# Patient Record
Sex: FEMALE | Race: WHITE | Employment: PART TIME | URBAN - METROPOLITAN AREA
[De-identification: names, ages, dates, MRNs, and addresses within clinical notes are randomized per-mention and may not be internally consistent; named-entity substitution may affect disease eponyms.]

---

## 2019-01-25 PROBLEM — N92.6 IRREGULAR MENSES: Status: ACTIVE | Noted: 2019-01-25

## 2019-01-25 PROBLEM — N92.6 MISSED PERIOD: Status: ACTIVE | Noted: 2019-01-25

## 2019-01-25 PROBLEM — D50.9 MICROCYTIC ANEMIA: Status: ACTIVE | Noted: 2019-01-25

## 2019-01-25 PROBLEM — D50.9 IRON DEFICIENCY ANEMIA: Status: ACTIVE | Noted: 2019-01-25

## 2019-01-25 PROBLEM — D50.9 MICROCYTIC ANEMIA: Status: RESOLVED | Noted: 2019-01-25 | Resolved: 2019-01-25

## 2019-01-25 PROBLEM — R53.83 FATIGUE: Status: ACTIVE | Noted: 2019-01-25

## 2019-01-26 PROBLEM — E53.8 LOW VITAMIN B12 LEVEL: Status: ACTIVE | Noted: 2019-01-26

## 2019-01-26 PROBLEM — R79.89 LOW VITAMIN B12 LEVEL: Status: ACTIVE | Noted: 2019-01-26

## 2019-02-28 PROBLEM — N92.1 MENORRHAGIA WITH IRREGULAR CYCLE: Status: ACTIVE | Noted: 2019-02-28

## 2019-02-28 PROBLEM — N92.6 MISSED PERIOD: Status: RESOLVED | Noted: 2019-01-25 | Resolved: 2019-02-28

## 2019-03-11 PROBLEM — Q51.28 UTERUS DIDELPHYS: Status: ACTIVE | Noted: 2019-03-11

## 2019-05-08 PROBLEM — L05.91 PILONIDAL CYST: Status: ACTIVE | Noted: 2019-05-08

## 2019-08-07 PROBLEM — L65.9 HAIR LOSS: Status: RESOLVED | Noted: 2019-01-25 | Resolved: 2019-08-07

## 2019-08-07 PROBLEM — R53.83 FATIGUE: Status: RESOLVED | Noted: 2019-01-25 | Resolved: 2019-08-07

## 2020-02-07 PROBLEM — E55.9 VITAMIN D DEFICIENCY: Status: ACTIVE | Noted: 2020-02-07

## 2020-02-07 PROBLEM — R63.39 PICKY EATER: Status: ACTIVE | Noted: 2020-02-07

## 2020-08-12 PROBLEM — N94.6 DYSMENORRHEA: Status: ACTIVE | Noted: 2020-08-12

## 2022-12-05 ENCOUNTER — TELEPHONE (OUTPATIENT)
Dept: OBGYN CLINIC | Age: 22
End: 2022-12-05

## 2022-12-05 DIAGNOSIS — O20.9 VAGINAL BLEEDING IN PREGNANCY, FIRST TRIMESTER: Primary | ICD-10-CM

## 2022-12-05 NOTE — TELEPHONE ENCOUNTER
Informed, she was OK waiting if nothing was sooner and as long as you are not too concerned, she is not either. She works tomorrow 522-9, nothing was available today either for her.

## 2022-12-05 NOTE — TELEPHONE ENCOUNTER
Santiago Zamudio had dating US last week, she was 7w3d on 12/1    She had some bleeding this weekend on Saturday that lasted about 6pm-1am    No clots, but heavy enough like a period.     Has IPV on 12/8 this Thursday    Her last hcg was over 26,000    Wondering if she needed to be seen sooner, recheck HCG?

## 2022-12-07 NOTE — TELEPHONE ENCOUNTER
No spotting since Satuday, feeling OK. No issues.     She is at work, she will try and go on lunch for labs

## 2022-12-08 PROBLEM — O34.591 PREGNANCY WITH CONGENITAL DUPLICATION OF UTERUS IN FIRST TRIMESTER: Status: ACTIVE | Noted: 2022-12-08

## 2022-12-08 PROBLEM — Q51.28 PREGNANCY WITH CONGENITAL DUPLICATION OF UTERUS IN FIRST TRIMESTER: Status: ACTIVE | Noted: 2022-12-08

## 2022-12-08 PROBLEM — O20.9 VAGINAL BLEEDING IN PREGNANCY, FIRST TRIMESTER: Status: ACTIVE | Noted: 2022-12-08

## 2023-01-04 ENCOUNTER — ROUTINE PRENATAL (OUTPATIENT)
Dept: OBGYN CLINIC | Age: 23
End: 2023-01-04

## 2023-01-04 VITALS — DIASTOLIC BLOOD PRESSURE: 68 MMHG | BODY MASS INDEX: 34.54 KG/M2 | WEIGHT: 214 LBS | SYSTOLIC BLOOD PRESSURE: 120 MMHG

## 2023-01-04 DIAGNOSIS — Z3A.12 12 WEEKS GESTATION OF PREGNANCY: ICD-10-CM

## 2023-01-04 DIAGNOSIS — O09.90 HIGH RISK PREGNANCY, ANTEPARTUM: Primary | ICD-10-CM

## 2023-01-04 DIAGNOSIS — O99.210 OBESITY IN PREGNANCY: ICD-10-CM

## 2023-01-04 DIAGNOSIS — Q51.28 UTERUS DIDELPHYS: ICD-10-CM

## 2023-01-04 DIAGNOSIS — O36.8390 ULTRASOUND SCAN DONE FOR INABILITY TO HEAR FETAL HEART TONES: ICD-10-CM

## 2023-01-04 PROCEDURE — 0502F SUBSEQUENT PRENATAL CARE: CPT | Performed by: ADVANCED PRACTICE MIDWIFE

## 2023-01-04 NOTE — PROGRESS NOTES
SUBJECTIVE:    Anjel Khan is here for her return OB visit. denies concerns today. She denies  feeling fetal movement. She denies vaginal bleeding. She denies vaginal discharge. She denies leaking of fluid. She denies uterine cramping. She denies  nausea and/or vomiting. OBJECTIVE:  Blood pressure 120/68, weight 214 lb (97.1 kg), not currently breastfeeding. Total weight gain: 2 lb (0.907 kg)    Anjel Moraleser accepted the flu vaccine as appropriate. Anjel Bill declined the COVID vaccine as appropriate    ASSESSMENT/PLAN:  IUP @ 12+2 weeks  S =D    High Risk Pregnancy  Due date is based on LMP and confirmed with 7w3d early dating ultrasound  Patient's prenatal labs are NOT completed. Patient's blood type A positive and rhogam is not indicated in pregnancy. Early glucola indicated: yes - not yet completed  Patient declined genetic screening. Anatomy scan referral sent  Total weight gain in pregnancy reviewed: Yes  Fetal movement reviewed      2. 12 weeks gestation of pregnancy  - Warning signs reviewed    3. Uterus didelphys  - Bhupinder Felix MD, Maternal Fetal Medicine, [facility]    4. Obesity in pregnancy  - early glucola  - Reviewed baby asa    5. Ultrasound scan done for inability to hear fetal heart tones        She was counseled regarding all of the above:    Return in about 4 weeks (around 2/1/2023) for Return OB.     The patient, Alex Austin,  was seen with a total time spent of 25 minutes for the visit on this date of service by the [facility]  On this date January 4, 2023,  I have spent greater than 50% of this visit:  [x] reviewing previous notes  [x] reviewing test results  [x] pre-charting  Discussed with patient:  [x] Importance of compliance with treatment plan  [x] Counseling  [] Coordinating care  [x] Documenting     Electronically Signed By: KAR Mesa CNM

## 2023-01-19 ENCOUNTER — HOSPITAL ENCOUNTER (OUTPATIENT)
Age: 23
Setting detail: SPECIMEN
Discharge: HOME | End: 2023-01-19

## 2023-01-19 DIAGNOSIS — O09.91 HIGH-RISK PREGNANCY, FIRST TRIMESTER: ICD-10-CM

## 2023-01-19 DIAGNOSIS — Z3A.08 8 WEEKS GESTATION OF PREGNANCY: ICD-10-CM

## 2023-01-19 LAB
ABO/RH: NORMAL
ABSOLUTE EOS #: 0.05 K/UL (ref 0–0.44)
ABSOLUTE IMMATURE GRANULOCYTE: 0.06 K/UL (ref 0–0.3)
ABSOLUTE LYMPH #: 1.69 K/UL (ref 1.1–3.7)
ABSOLUTE MONO #: 0.57 K/UL (ref 0.1–1.2)
ANTIBODY SCREEN: NEGATIVE
BASOPHILS # BLD: 0 % (ref 0–2)
BASOPHILS ABSOLUTE: 0.03 K/UL (ref 0–0.2)
EOSINOPHILS RELATIVE PERCENT: 1 % (ref 1–4)
GLUCOSE ADMINISTRATION: NORMAL
GLUCOSE TOLERANCE SCREEN 50G: 101 MG/DL (ref 70–135)
HCT VFR BLD CALC: 42.4 % (ref 36.3–47.1)
HEMOGLOBIN: 13.3 G/DL (ref 11.9–15.1)
HEPATITIS B SURFACE ANTIGEN: NONREACTIVE
HEPATITIS C ANTIBODY: NONREACTIVE
HIV AG/AB: NONREACTIVE
IMMATURE GRANULOCYTES: 1 %
LYMPHOCYTES # BLD: 19 % (ref 24–43)
MCH RBC QN AUTO: 27.4 PG (ref 25.2–33.5)
MCHC RBC AUTO-ENTMCNC: 31.4 G/DL (ref 28.4–34.8)
MCV RBC AUTO: 87.4 FL (ref 82.6–102.9)
MONOCYTES # BLD: 7 % (ref 3–12)
NRBC AUTOMATED: 0 PER 100 WBC
PDW BLD-RTO: 13.9 % (ref 11.8–14.4)
PLATELET # BLD: 276 K/UL (ref 138–453)
PMV BLD AUTO: 10.2 FL (ref 8.1–13.5)
RBC # BLD: 4.85 M/UL (ref 3.95–5.11)
RUBV IGG SER QL: 205.2 IU/ML
SEG NEUTROPHILS: 72 % (ref 36–65)
SEGMENTED NEUTROPHILS ABSOLUTE COUNT: 6.3 K/UL (ref 1.5–8.1)
T. PALLIDUM, IGG: NONREACTIVE
WBC # BLD: 8.7 K/UL (ref 3.5–11.3)

## 2023-01-20 LAB — VZV IGG SER QL IA: 2.41

## 2023-02-08 ENCOUNTER — ROUTINE PRENATAL (OUTPATIENT)
Dept: OBGYN CLINIC | Age: 23
End: 2023-02-08

## 2023-02-08 ENCOUNTER — HOSPITAL ENCOUNTER (OUTPATIENT)
Age: 23
Setting detail: SPECIMEN
Discharge: HOME | End: 2023-02-08

## 2023-02-08 VITALS — WEIGHT: 214 LBS | DIASTOLIC BLOOD PRESSURE: 78 MMHG | BODY MASS INDEX: 34.54 KG/M2 | SYSTOLIC BLOOD PRESSURE: 122 MMHG

## 2023-02-08 DIAGNOSIS — O99.210 OBESITY IN PREGNANCY: ICD-10-CM

## 2023-02-08 DIAGNOSIS — Q51.28 UTERUS DIDELPHYS: ICD-10-CM

## 2023-02-08 DIAGNOSIS — O09.90 HIGH RISK PREGNANCY, ANTEPARTUM: ICD-10-CM

## 2023-02-08 DIAGNOSIS — N89.8 VAGINAL ODOR: ICD-10-CM

## 2023-02-08 DIAGNOSIS — Z3A.17 17 WEEKS GESTATION OF PREGNANCY: Primary | ICD-10-CM

## 2023-02-08 DIAGNOSIS — M79.662 PAIN OF LEFT CALF: ICD-10-CM

## 2023-02-08 RX ORDER — ONDANSETRON 4 MG/1
4 TABLET, ORALLY DISINTEGRATING ORAL 3 TIMES DAILY PRN
Qty: 21 TABLET | Refills: 0 | Status: SHIPPED | OUTPATIENT
Start: 2023-02-08

## 2023-02-09 DIAGNOSIS — N89.8 VAGINAL ODOR: ICD-10-CM

## 2023-02-09 LAB
CANDIDA SPECIES, DNA PROBE: NEGATIVE
GARDNERELLA VAGINALIS, DNA PROBE: NEGATIVE
SOURCE: NORMAL
TRICHOMONAS VAGINALIS DNA: NEGATIVE

## 2023-02-28 ENCOUNTER — ROUTINE PRENATAL (OUTPATIENT)
Dept: PERINATAL CARE | Age: 23
End: 2023-02-28

## 2023-02-28 VITALS
RESPIRATION RATE: 16 BRPM | HEIGHT: 66 IN | DIASTOLIC BLOOD PRESSURE: 78 MMHG | SYSTOLIC BLOOD PRESSURE: 131 MMHG | WEIGHT: 216 LBS | TEMPERATURE: 98.2 F | HEART RATE: 84 BPM | BODY MASS INDEX: 34.72 KG/M2

## 2023-02-28 DIAGNOSIS — Z3A.20 20 WEEKS GESTATION OF PREGNANCY: ICD-10-CM

## 2023-02-28 DIAGNOSIS — Z36.86 ENCOUNTER FOR SCREENING FOR RISK OF PRE-TERM LABOR: ICD-10-CM

## 2023-02-28 DIAGNOSIS — Q51.28 PREGNANCY WITH CONGENITAL DUPLICATION OF UTERUS IN SECOND TRIMESTER: ICD-10-CM

## 2023-02-28 DIAGNOSIS — O34.592 PREGNANCY WITH CONGENITAL DUPLICATION OF UTERUS IN SECOND TRIMESTER: ICD-10-CM

## 2023-02-28 DIAGNOSIS — O99.212 OBESITY AFFECTING PREGNANCY IN SECOND TRIMESTER: ICD-10-CM

## 2023-02-28 DIAGNOSIS — O09.899 SINGLE UMBILICAL ARTERY, MATERNAL, ANTEPARTUM: ICD-10-CM

## 2023-02-28 DIAGNOSIS — O44.22 PLACENTA MARGINALIS IN SECOND TRIMESTER: Primary | ICD-10-CM

## 2023-02-28 LAB
ABDOMINAL CIRCUMFERENCE: NORMAL
ABDOMINAL CIRCUMFERENCE: NORMAL
BIPARIETAL DIAMETER: NORMAL
BIPARIETAL DIAMETER: NORMAL
ESTIMATED FETAL WEIGHT: NORMAL
ESTIMATED FETAL WEIGHT: NORMAL
FEMORAL DIAMETER: NORMAL
FEMORAL DIAMETER: NORMAL
HC/AC: NORMAL
HC/AC: NORMAL
HEAD CIRCUMFERENCE: NORMAL
HEAD CIRCUMFERENCE: NORMAL

## 2023-02-28 PROCEDURE — 99999 PR OFFICE/OUTPT VISIT,PROCEDURE ONLY: CPT | Performed by: OBSTETRICS & GYNECOLOGY

## 2023-02-28 PROCEDURE — 76817 TRANSVAGINAL US OBSTETRIC: CPT | Performed by: OBSTETRICS & GYNECOLOGY

## 2023-02-28 PROCEDURE — 76811 OB US DETAILED SNGL FETUS: CPT | Performed by: OBSTETRICS & GYNECOLOGY

## 2023-02-28 NOTE — PROGRESS NOTES
Obstetric/Gynecology Maternal Fetal Medicine Resident Note    Updated patient regarding US findings of marginal cord insertion and 2 vessel cord. She is agreeable to formal consult with MFM attending physician.      DO MARILYN Espinoza Resident, PGY2  [facility]  2/28/2023, 4:25 PM

## 2023-03-01 DIAGNOSIS — O43.199 MARGINAL INSERTION OF UMBILICAL CORD AFFECTING MANAGEMENT OF MOTHER: ICD-10-CM

## 2023-03-01 DIAGNOSIS — O09.899 TWO VESSEL UMBILICAL CORD, ANTEPARTUM, UNSPECIFIED TRIMESTER: ICD-10-CM

## 2023-03-01 DIAGNOSIS — Q51.28 PREGNANCY WITH CONGENITAL DUPLICATION OF UTERUS, ANTEPARTUM: Primary | ICD-10-CM

## 2023-03-01 DIAGNOSIS — O34.599 PREGNANCY WITH CONGENITAL DUPLICATION OF UTERUS, ANTEPARTUM: Primary | ICD-10-CM

## 2023-03-07 ENCOUNTER — ROUTINE PRENATAL (OUTPATIENT)
Dept: OBGYN CLINIC | Age: 23
End: 2023-03-07

## 2023-03-07 VITALS — DIASTOLIC BLOOD PRESSURE: 72 MMHG | BODY MASS INDEX: 34.86 KG/M2 | SYSTOLIC BLOOD PRESSURE: 110 MMHG | WEIGHT: 216 LBS

## 2023-03-07 DIAGNOSIS — O99.210 OBESITY IN PREGNANCY: ICD-10-CM

## 2023-03-07 DIAGNOSIS — Z3A.21 21 WEEKS GESTATION OF PREGNANCY: Primary | ICD-10-CM

## 2023-03-07 DIAGNOSIS — O09.899 SINGLE UMBILICAL ARTERY, MATERNAL, ANTEPARTUM: ICD-10-CM

## 2023-03-07 DIAGNOSIS — Q51.28 UTERUS DIDELPHYS: ICD-10-CM

## 2023-03-07 DIAGNOSIS — O44.20 PLACENTA MARGINALIS, ANTEPARTUM: ICD-10-CM

## 2023-03-07 DIAGNOSIS — O09.90 HIGH RISK PREGNANCY, ANTEPARTUM: ICD-10-CM

## 2023-03-07 PROCEDURE — 0502F SUBSEQUENT PRENATAL CARE: CPT | Performed by: NURSE PRACTITIONER

## 2023-03-07 RX ORDER — ONDANSETRON 4 MG/1
4 TABLET, FILM COATED ORAL EVERY 8 HOURS PRN
Qty: 30 TABLET | Refills: 1 | Status: SHIPPED | OUTPATIENT
Start: 2023-03-07

## 2023-03-07 NOTE — PROGRESS NOTES
Presents for OB visit  Gestation 21w1d  Estimated Date of Delivery: 23    Insurance [employer]    IPV bag/mug given: 22    G1P    Plans to deliver: [facility]    1st trimester screen/NIPs: unsure    AFP    Fetal Echo    High Risk:  Uterus didelphys  Vaginal bleeding in pregnancy   Single umbilical artery  Placenta marginalis      Early 1 hr GTT: Yes - passed 101 on 23    Covid Vaccine: No- Declines    Flu Vaccine: 22    Tdap:    Rhogam:    1hr GTT:    3hr GTT:    GBS:    2nd trimester appointment with Dr. Wells :     3rd trimester appointment with Dr. Wells :                     OB History    Para Term  AB Living   1 0 0 0 0 0   SAB IAB Ectopic Molar Multiple Live Births   0 0 0 0 0 0      # Outcome Date GA Lbr Colin/2nd Weight Sex Delivery Anes PTL Lv   1 Current                     Allergies   Allergen Reactions    No Known Allergies      Current Outpatient Medications   Medication Sig Dispense Refill    ondansetron (ZOFRAN) 4 MG tablet Take 1 tablet by mouth every 8 hours as needed for Nausea or Vomiting 30 tablet 1    Prenat w/o R-DE-Tsfcasx-FA-DHA (PRENATE ENHANCE) 28-0.6-0.4-400 MG CAPS Take 1 capsule by mouth daily 30 capsule 11     No current facility-administered medications for this visit.           Past Medical History:   Diagnosis Date    Pilonidal cyst     Removed 2019    Uterine anomaly        Past Surgical History:   Procedure Laterality Date    CYST REMOVAL  2019     Headaches: no  Swelling: no  Bleeding: no  Discharge: no   Dysuria: no  Nausea: intermittently still takes zofranPRN  Heartburn: no  Epigastric pain: no  Contractions: no  Leakage of fluid: no  + fetal movement       Return 4 WEEKS    Labs as indicated CBC,1 hr GTT, UA between 24-28 weeks  Antibody screen: n/a     PTL signs reviewed, if indicated  Kick Count Instructions given if indicated:  The patient was instructed on fetal kick counts and was given a kick sheet to complete  every 8 hours. This is to begin at 28 weeks gestation. She was instructed that the baby should move at a minimum of ten times within one hour after a meal. The patient was instructed to lay down on her left side twenty minutes after eating and count movements for up to one hour with a target value of ten movements. She was instructed to notify the office if she did not make that target after two attempts or if after any attempt there was less than four movements. After hour numbers reviewed , along with labor signs if indicated. Contractions/cramping between 5-7 minutes and persisting even with attempts of increased water and laying on side. Fluid leakage, bleeding        Of the 30 minute duration appointment visit, Comfort Lazaro CNP spent at least 50% of the face-to-face time in counseling, explanation of diagnosis, planning of further management, and answering all questions.

## 2023-03-10 NOTE — TELEPHONE ENCOUNTER
Dr. Nando Pinon, patient interested in free prenatal vitamins and iron per response from [address] [facility]. [facility] Baby Box pending for your convenience. Thank you,  Deuce Barahona, PharmD  [address]  Department, toll free: [address]      =======================================================================    POPULATION HEALTH CLINICAL PHARMACY REVIEW - Be Well With Baby    SUBJECTIVE  Nuvia Landry is a 23 y.o. female currently identified as interested in receiving a [facility] \"Baby Box\" containing a 1 year supply of prenatal vitamins from [address] [place] [address]. Contents of Baby Box:  Welcome Letter  6 month supply of Nature's Blend Prenatal Multivitamin with Minerals (Take 1 softgel once daily) with 1 refill    Thank you  Vesna WrightD  [address]  Department, toll free: [phone]  [address]

## 2023-03-12 NOTE — PROGRESS NOTES
Ft leg pain  Presents for OB visit  Gestation 17w2d  Estimated Date of Delivery: 23    Insurance [employer]    IPV bag/mug given: 22    G1P    Plans to deliver: [facility]    1st trimester screen/NIPs: unsure    AFP    Fetal Echo    High Risk:  Uterus didelphys  Vaginal bleeding in pregnancy     Early 1 hr GTT: Yes ordered 22 BMI >30    Covid Vaccine: No- Declines    Flu Vaccine: 22    Tdap:    Rhogam:    1hr GTT:    3hr GTT:    GBS:    2nd trimester appointment with Dr. Parmjit Marie :     3rd trimester appointment with Dr. Parmjit Marie :      The patient was seen and evaluated. There was Positive fetal movements flutters No contractions or leakage of fluid. Signs and symptoms of  labor as well as labor were reviewed. The Nuchal Translucency testing was reviewed and found to be declined. A single marker MSAFP was declined for a 15-20 week gestational age window. TOP ST OH Reviewed. Dates were reviewed with the patient. An 18-22 week anatomy ultrasound has been ordered and scheduled 23. The patient will return to the office for her next visit in 4 weeks. See antepartum flow sheet. OB History    Para Term  AB Living   1 0 0 0 0 0   SAB IAB Ectopic Molar Multiple Live Births   0 0 0 0 0 0      # Outcome Date GA Lbr Colin/2nd Weight Sex Delivery Anes PTL Lv   1 Current                     Allergies   Allergen Reactions    No Known Allergies      Current Outpatient Medications   Medication Sig Dispense Refill    Prenat w/o E-NN-Cqmspjo-FA-DHA (PRENATE ENHANCE) 28-0.6-0.4-400 MG CAPS Take 1 capsule by mouth daily 30 capsule 11     No current facility-administered medications for this visit.            Past Medical History:   Diagnosis Date    Pilonidal cyst     Removed 2019    Uterine anomaly        Past Surgical History:   Procedure Laterality Date    CYST REMOVAL  2019     Headaches: no  Swelling: no  Bleeding: no  Discharge: no abnormal discharge, but PPD#1    Doing well. Pain well controlled on oral pain medication. Tolerating regular diet. Voiding well. Ambulating without difficulty. Lochia is scant. Breast feeding.     Vitals:    23 1830 23 1845 23 2104 23 0129   BP: 105/56 103/56 105/50 96/46   BP Location:   Left arm Left arm   Patient Position:   Semi-Greenfield's Semi-Greenfield's   Cuff Size:   Adult Regular Adult Regular   Pulse:   76 68   Resp:   16 16   Temp:   97.6  F (36.4  C) 98  F (36.7  C)   TempSrc:   Oral Oral   SpO2:       Weight:       Height:         General Appearance: NAD  Abdomen: Soft, NT, ND. Fundus firm at U-2  Extremities: NT, trace edema    Hemoglobin   Date Value Ref Range Status   2023 13.6 11.7 - 15.7 g/dL Final   2022 12.2 11.7 - 15.7 g/dL Final   2018 14.0 11.7 - 15.7 g/dL Final   2018 14.7 11.7 - 15.7 g/dL Final   ]    A/P: 28 year old  PPD#1 s/p . Hemodynamically stable.     S/p   -- routine postpartum cares  -- encouraged to ambulate  -- PO pain meds for pain control   -- regular diet    Dispo:   -- likely discharge home today at the 24 hour karla post delivery if infant is cleared for discharge as well or tomorrow     Enoch Gasca MD  [facility]            noting vaginal odor   Dysuria: no  Nausea: yes - still at times some times worse than other and vomiting at times has not lost weight. She states heartburn now making worse too. Encouraged pepcid 20mg bid and send in zofran     Epigastric pain: no  Contractions: no  Leakage of fluid: no  + fetal movement - feels flutters sensation at meka     She does have complaint of left calf pain, states maybe cramping feeling, but that has been pretty persistent over past week. She denies  redness or swelling to calf or chest pain or sob. Upon exam bilateral lower extremities: no swelling or erythema or increased warmth appreciated, distal pulses intact. Given pregnancy though and persistent calf pain recommend left lower extremity venous doppler to rule out DVT. Informed if develops increased pain, swelling or redness or chest pain sob seek emergent care. Pelvic exam culture obtained and sent  + white d/c no bleeding    Return 4 WEEKS    Labs as indicated vag dna    PTL signs reviewed, if indicated  Kick Count Instructions given if indicated: The patient was instructed on fetal kick counts and was given a kick sheet to complete every 8 hours. This is to begin at 28 weeks gestation. She was instructed that the baby should move at a minimum of ten times within one hour after a meal. The patient was instructed to lay down on her left side twenty minutes after eating and count movements for up to one hour with a target value of ten movements. She was instructed to notify the office if she did not make that target after two attempts or if after any attempt there was less than four movements. After hour numbers reviewed , along with labor signs if indicated. Contractions/cramping between 5-7 minutes and persisting even with attempts of increased water and laying on side.    Fluid leakage, bleeding        Of the 30 minute duration appointment visit, Tatum Quinn CNP spent at least 50% of the face-to-face time in counseling, explanation of diagnosis, planning of further management, and answering all questions.

## 2023-03-13 ENCOUNTER — ROUTINE PRENATAL (OUTPATIENT)
Dept: PERINATAL CARE | Age: 23
End: 2023-03-13
Payer: COMMERCIAL

## 2023-03-13 VITALS
RESPIRATION RATE: 16 BRPM | BODY MASS INDEX: 34.72 KG/M2 | DIASTOLIC BLOOD PRESSURE: 73 MMHG | TEMPERATURE: 98.4 F | SYSTOLIC BLOOD PRESSURE: 119 MMHG | HEIGHT: 66 IN | HEART RATE: 101 BPM | WEIGHT: 216.05 LBS

## 2023-03-13 DIAGNOSIS — Q51.28 PREGNANCY WITH CONGENITAL DUPLICATION OF UTERUS IN SECOND TRIMESTER: Primary | ICD-10-CM

## 2023-03-13 DIAGNOSIS — O99.212 OBESITY AFFECTING PREGNANCY IN SECOND TRIMESTER: ICD-10-CM

## 2023-03-13 DIAGNOSIS — Z3A.22 22 WEEKS GESTATION OF PREGNANCY: ICD-10-CM

## 2023-03-13 DIAGNOSIS — O34.592 PREGNANCY WITH CONGENITAL DUPLICATION OF UTERUS IN SECOND TRIMESTER: Primary | ICD-10-CM

## 2023-03-13 DIAGNOSIS — O09.899 SINGLE UMBILICAL ARTERY, MATERNAL, ANTEPARTUM: ICD-10-CM

## 2023-03-13 DIAGNOSIS — O44.22 PLACENTA MARGINALIS IN SECOND TRIMESTER: ICD-10-CM

## 2023-03-13 LAB
ABDOMINAL CIRCUMFERENCE: NORMAL
BIPARIETAL DIAMETER: NORMAL
ESTIMATED FETAL WEIGHT: NORMAL
FEMORAL DIAMETER: NORMAL
HC/AC: NORMAL
HEAD CIRCUMFERENCE: NORMAL

## 2023-03-13 PROCEDURE — 76817 TRANSVAGINAL US OBSTETRIC: CPT | Performed by: OBSTETRICS & GYNECOLOGY

## 2023-03-13 PROCEDURE — 76815 OB US LIMITED FETUS(S): CPT | Performed by: OBSTETRICS & GYNECOLOGY

## 2023-03-13 PROCEDURE — 99999 PR OFFICE/OUTPT VISIT,PROCEDURE ONLY: CPT | Performed by: OBSTETRICS & GYNECOLOGY

## 2023-03-27 ENCOUNTER — ROUTINE PRENATAL (OUTPATIENT)
Dept: PERINATAL CARE | Age: 23
End: 2023-03-27
Payer: COMMERCIAL

## 2023-03-27 VITALS
HEIGHT: 66 IN | HEART RATE: 121 BPM | SYSTOLIC BLOOD PRESSURE: 123 MMHG | DIASTOLIC BLOOD PRESSURE: 75 MMHG | RESPIRATION RATE: 16 BRPM | TEMPERATURE: 98.2 F | BODY MASS INDEX: 35.15 KG/M2 | WEIGHT: 218.7 LBS

## 2023-03-27 DIAGNOSIS — Z36.4 ULTRASOUND FOR ANTENATAL SCREENING FOR FETAL GROWTH RESTRICTION: ICD-10-CM

## 2023-03-27 DIAGNOSIS — O44.22 PLACENTA MARGINALIS IN SECOND TRIMESTER: ICD-10-CM

## 2023-03-27 DIAGNOSIS — O34.02 SEPTATE UTERUS AFFECTING PREGNANCY IN SECOND TRIMESTER: ICD-10-CM

## 2023-03-27 DIAGNOSIS — Z3A.24 24 WEEKS GESTATION OF PREGNANCY: ICD-10-CM

## 2023-03-27 DIAGNOSIS — Q51.28 SEPTATE UTERUS AFFECTING PREGNANCY IN SECOND TRIMESTER: ICD-10-CM

## 2023-03-27 DIAGNOSIS — O09.899 SINGLE UMBILICAL ARTERY, MATERNAL, ANTEPARTUM: Primary | ICD-10-CM

## 2023-03-27 DIAGNOSIS — O99.212 OBESITY AFFECTING PREGNANCY IN SECOND TRIMESTER: ICD-10-CM

## 2023-03-27 PROCEDURE — 99204 OFFICE O/P NEW MOD 45 MIN: CPT | Performed by: OBSTETRICS & GYNECOLOGY

## 2023-03-27 PROCEDURE — 76816 OB US FOLLOW-UP PER FETUS: CPT | Performed by: OBSTETRICS & GYNECOLOGY

## 2023-03-27 PROCEDURE — 76820 UMBILICAL ARTERY ECHO: CPT | Performed by: OBSTETRICS & GYNECOLOGY

## 2023-03-27 PROCEDURE — 76817 TRANSVAGINAL US OBSTETRIC: CPT | Performed by: OBSTETRICS & GYNECOLOGY

## 2023-03-29 ENCOUNTER — HOSPITAL ENCOUNTER (OUTPATIENT)
Age: 23
Setting detail: SPECIMEN
Discharge: HOME | End: 2023-03-29
Payer: COMMERCIAL

## 2023-03-29 ENCOUNTER — HOSPITAL ENCOUNTER (OUTPATIENT)
Age: 23
Discharge: HOME | End: 2023-03-29
Payer: COMMERCIAL

## 2023-03-29 DIAGNOSIS — O09.90 HIGH RISK PREGNANCY, ANTEPARTUM: ICD-10-CM

## 2023-03-29 DIAGNOSIS — Z3A.21 21 WEEKS GESTATION OF PREGNANCY: ICD-10-CM

## 2023-03-29 LAB
ABSOLUTE EOS #: 0.1 K/UL (ref 0–0.4)
ABSOLUTE LYMPH #: 1.6 K/UL (ref 1–4.8)
ABSOLUTE MONO #: 0.8 K/UL (ref 0.1–1.3)
BACTERIA: ABNORMAL
BASOPHILS # BLD: 0 % (ref 0–2)
BASOPHILS ABSOLUTE: 0 K/UL (ref 0–0.2)
BILIRUBIN URINE: NEGATIVE
CASTS UA: ABNORMAL /LPF
COLOR: YELLOW
EOSINOPHILS RELATIVE PERCENT: 1 % (ref 0–4)
EPITHELIAL CELLS UA: ABNORMAL /HPF
GLUCOSE ADMINISTRATION: NORMAL
GLUCOSE TOLERANCE SCREEN 50G: 107 MG/DL (ref 70–135)
GLUCOSE UR STRIP.AUTO-MCNC: NEGATIVE MG/DL
HCT VFR BLD AUTO: 34.1 % (ref 36–46)
HGB BLD-MCNC: 11.6 G/DL (ref 12–16)
KETONES UR STRIP.AUTO-MCNC: NEGATIVE MG/DL
LEUKOCYTE ESTERASE UR QL STRIP.AUTO: ABNORMAL
LYMPHOCYTES # BLD: 15 % (ref 24–44)
MCH RBC QN AUTO: 28.4 PG (ref 26–34)
MCHC RBC AUTO-ENTMCNC: 34 G/DL (ref 31–37)
MCV RBC AUTO: 83.7 FL (ref 80–100)
MONOCYTES # BLD: 7 % (ref 1–7)
NITRITE UR QL STRIP.AUTO: NEGATIVE
PDW BLD-RTO: 13.7 % (ref 11.5–14.9)
PLATELET # BLD AUTO: 265 K/UL (ref 150–450)
PMV BLD AUTO: 7.7 FL (ref 6–12)
PROT UR STRIP.AUTO-MCNC: 8 MG/DL (ref 5–8)
PROT UR STRIP.AUTO-MCNC: ABNORMAL MG/DL
RBC # BLD: 4.07 M/UL (ref 4–5.2)
RBC CLUMPS #/AREA URNS AUTO: ABNORMAL /HPF
SEG NEUTROPHILS: 77 % (ref 36–66)
SEGMENTED NEUTROPHILS ABSOLUTE COUNT: 8.4 K/UL (ref 1.3–9.1)
SPECIFIC GRAVITY UA: 1.02 (ref 1–1.03)
TURBIDITY: ABNORMAL
URINE HGB: NEGATIVE
UROBILINOGEN, URINE: NORMAL
WBC # BLD AUTO: 10.8 K/UL (ref 3.5–11)
WBC UA: ABNORMAL /HPF

## 2023-03-29 PROCEDURE — 82105 ALPHA-FETOPROTEIN SERUM: CPT

## 2023-03-29 PROCEDURE — 87086 URINE CULTURE/COLONY COUNT: CPT

## 2023-03-29 PROCEDURE — 81001 URINALYSIS AUTO W/SCOPE: CPT

## 2023-03-29 PROCEDURE — 36415 COLL VENOUS BLD VENIPUNCTURE: CPT

## 2023-03-29 PROCEDURE — 85025 COMPLETE CBC W/AUTO DIFF WBC: CPT

## 2023-03-29 PROCEDURE — 82950 GLUCOSE TEST: CPT

## 2023-03-30 LAB
MICROORGANISM SPEC CULT: NORMAL
SPECIMEN DESCRIPTION: NORMAL

## 2023-04-01 LAB
AFP INTERPRETATION: NORMAL
AFP MOM: 0.95
AFP SPECIMEN: NORMAL
AFP: 89 NG/ML
DATE OF BIRTH: NORMAL
DATING METHOD: NORMAL
DETERMINED BY: NORMAL
DIABETIC: NORMAL
DONOR EGG?: NORMAL
DUE DATE: NORMAL
ESTIMATED DUE DATE: NORMAL
FAMILY HISTORY NTD: NORMAL
GESTATIONAL AGE: NORMAL
IN VITRO FERTILIZATION: NORMAL
INSULIN REQ DIABETES: NO
LAST MENSTRUAL PERIOD: NORMAL
MATERNAL AGE AT EDD: 25.2 YR
MATERNAL WEIGHT: 218
MONOCHORIONIC TWINS: NORMAL
NUMBER OF FETUSES: NORMAL
PATIENT WEIGHT UNITS: NORMAL
PATIENT WEIGHT: NORMAL
RACE (MATERNAL): NORMAL
RACE: NORMAL
REPEAT SPECIMEN?: NORMAL
SMOKING: NO
SMOKING: NORMAL
VALPROIC/CARBAMAZEP: NORMAL
ZZ NTE CLEAN UP: HISTORY: NORMAL

## 2023-04-06 ENCOUNTER — CARE COORDINATION (OUTPATIENT)
Dept: OTHER | Facility: CLINIC | Age: 23
End: 2023-04-06

## 2023-04-06 NOTE — CARE COORDINATION
Ambulatory Care Coordination Note    ACM attempted to reach patient for introduction to Associate Care Management related to Maternity CM. HIPAA compliant message left requesting a return phone call at patient convenience. Plan for follow-up call in 7-10 days      Future Appointments   Date Time Provider Dez Monreal   4/24/2023  2:30 PM ULTRASONOGRAPHER 4 Mat Fetal [facility]   5/3/2023  7:45 AM Hernando Ernandez, APRN - CN [facility]   5/17/2023  3:00 PM DO Josiah [facility]buena [facility] ANNETTE Simmons, RN  Associate Care Manager   Cell: [phone]  [email or web address]

## 2023-04-06 NOTE — CARE COORDINATION
resources as needed. Discuss / follow up on: Goal progress and follow-up appointment scheduling  Summary Note: Patient reports that she is doing well. She denies any red-flag s/s or needs r/t DME, medications or appointments. She is now 25.3 weeks pregnant, due on 7/17/23. Patient's pregnancy is complicated by single umbilical artery and septate uterus. Patient states that she does not have new concerns or changes in her medications or history. She did participate in [facility] and has not completed the program at this time. She denies any needs currently and is appreciative of the follow-up call. Social determinants of health impacting care: None. Shared Decision Making completed with patient regarding benefits of CM. She is agreeable to a follow-up call with ACM in 4 weeks and will call with any needs in the meantime. ANNETTE Lezama, RN  Associate Care Manager   Cell: [phone]  [email or web address]

## 2023-04-24 ENCOUNTER — ROUTINE PRENATAL (OUTPATIENT)
Dept: PERINATAL CARE | Age: 23
End: 2023-04-24
Payer: COMMERCIAL

## 2023-04-24 VITALS
DIASTOLIC BLOOD PRESSURE: 76 MMHG | SYSTOLIC BLOOD PRESSURE: 122 MMHG | RESPIRATION RATE: 16 BRPM | WEIGHT: 220 LBS | HEIGHT: 66 IN | TEMPERATURE: 98.2 F | HEART RATE: 104 BPM | BODY MASS INDEX: 35.36 KG/M2

## 2023-04-24 DIAGNOSIS — Z13.89 ENCOUNTER FOR ROUTINE SCREENING FOR MALFORMATION USING ULTRASONICS: ICD-10-CM

## 2023-04-24 DIAGNOSIS — Z36.4 ULTRASOUND FOR ANTENATAL SCREENING FOR FETAL GROWTH RESTRICTION: ICD-10-CM

## 2023-04-24 DIAGNOSIS — O44.23 PLACENTA MARGINALIS IN THIRD TRIMESTER: ICD-10-CM

## 2023-04-24 DIAGNOSIS — O99.213 OBESITY AFFECTING PREGNANCY IN THIRD TRIMESTER: ICD-10-CM

## 2023-04-24 DIAGNOSIS — O09.899 SINGLE UMBILICAL ARTERY, MATERNAL, ANTEPARTUM: Primary | ICD-10-CM

## 2023-04-24 DIAGNOSIS — Q51.28 SEPTATE UTERUS AFFECTING PREGNANCY IN THIRD TRIMESTER: ICD-10-CM

## 2023-04-24 DIAGNOSIS — O34.03 SEPTATE UTERUS AFFECTING PREGNANCY IN THIRD TRIMESTER: ICD-10-CM

## 2023-04-24 DIAGNOSIS — Z3A.28 28 WEEKS GESTATION OF PREGNANCY: ICD-10-CM

## 2023-04-24 PROCEDURE — 76819 FETAL BIOPHYS PROFIL W/O NST: CPT | Performed by: OBSTETRICS & GYNECOLOGY

## 2023-04-24 PROCEDURE — 76827 ECHO EXAM OF FETAL HEART: CPT | Performed by: OBSTETRICS & GYNECOLOGY

## 2023-04-24 PROCEDURE — 76825 ECHO EXAM OF FETAL HEART: CPT | Performed by: OBSTETRICS & GYNECOLOGY

## 2023-04-24 PROCEDURE — 76817 TRANSVAGINAL US OBSTETRIC: CPT | Performed by: OBSTETRICS & GYNECOLOGY

## 2023-04-24 PROCEDURE — 76805 OB US >/= 14 WKS SNGL FETUS: CPT | Performed by: OBSTETRICS & GYNECOLOGY

## 2023-04-24 PROCEDURE — 76820 UMBILICAL ARTERY ECHO: CPT | Performed by: OBSTETRICS & GYNECOLOGY

## 2023-04-24 PROCEDURE — 99999 PR OFFICE/OUTPT VISIT,PROCEDURE ONLY: CPT | Performed by: OBSTETRICS & GYNECOLOGY

## 2023-04-24 PROCEDURE — 93325 DOPPLER ECHO COLOR FLOW MAPG: CPT | Performed by: OBSTETRICS & GYNECOLOGY

## 2023-05-03 ENCOUNTER — ROUTINE PRENATAL (OUTPATIENT)
Dept: OBGYN CLINIC | Age: 23
End: 2023-05-03

## 2023-05-03 VITALS — SYSTOLIC BLOOD PRESSURE: 118 MMHG | BODY MASS INDEX: 35.51 KG/M2 | WEIGHT: 220 LBS | DIASTOLIC BLOOD PRESSURE: 72 MMHG

## 2023-05-03 DIAGNOSIS — O09.90 HIGH RISK PREGNANCY, ANTEPARTUM: Primary | ICD-10-CM

## 2023-05-03 DIAGNOSIS — Q51.28 UTERUS DIDELPHYS: ICD-10-CM

## 2023-05-03 DIAGNOSIS — O99.210 OBESITY IN PREGNANCY: ICD-10-CM

## 2023-05-03 DIAGNOSIS — Z3A.29 29 WEEKS GESTATION OF PREGNANCY: ICD-10-CM

## 2023-05-03 DIAGNOSIS — O44.20 PLACENTA MARGINALIS, ANTEPARTUM: ICD-10-CM

## 2023-05-03 DIAGNOSIS — O09.899 SINGLE UMBILICAL ARTERY, MATERNAL, ANTEPARTUM: ICD-10-CM

## 2023-05-03 PROCEDURE — 0502F SUBSEQUENT PRENATAL CARE: CPT | Performed by: ADVANCED PRACTICE MIDWIFE

## 2023-05-03 NOTE — PROGRESS NOTES
SUBJECTIVE:    Matt Swanson is here for her return OB visit. denies concerns today. She reports  feeling fetal movement. She denies vaginal bleeding. She denies vaginal discharge. She denies leaking of fluid. She denies uterine cramping. She denies  nausea and/or vomiting. OBJECTIVE:  Blood pressure 118/72, weight 220 lb (99.8 kg), not currently breastfeeding. Total weight gain: 8 lb (3.629 kg)      Vaccinations:   Undecided about tdap. Reviewed recommendations. ASSESSMENT/PLAN:  IUP @ 29+2 weeks  S=D       High Risk Pregnancy  Due date is based on LMP and confirmed with 7w3d early dating ultrasound  Patient's prenatal labs are completed. Patient's blood type A POSITIVE  and rhogam is not indicated in the pregnancy  Early glucola indicated: yes - 101  Patient accepted genetic screening. Msafp was ordered: completed negative. NIPS pending  Anatomy scan scheduled 23 completed. Placenta posterior,normal cord insertion: MARGINAL cord insertion, cervical length 4.25cm, no low lying, no previa noted. Follow up in 2 weeks for completion of anatomy and CL. 2 vessel umbilical cord. 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 3/13/23 CL 4.01cm  3/27/23 EFW 55% CL 34-37mm  23 EFW 55% marginal cord insertion. CL 38-39mm. 2 vessel umbilical cord/single umbilical artery again noted. F/up in 4 weeks. Scheduled 23 fetal echo- no obvious evidence of congenital heart defects present today. Glucola between 24-28 weeks. completed   Pass  3/29/2023: Glucose tolerance screen 50g   Total weight gain in pregnancy reviewed: Yes  Fetal Kick Count was discussed and explained. She was instructed to lay on her left side 20 minutes after eating and count movements for up to 2 hours with a target value of 10 movements. Instructed to notify office if she does not make the target.   Reviewed GBS at 36 weeks and patient is agreeable  Reviewed signs and symptoms of  labor:  Yes  Reviewed signs and

## 2023-05-15 RX ORDER — ONDANSETRON 4 MG/1
4 TABLET, FILM COATED ORAL EVERY 8 HOURS PRN
Qty: 30 TABLET | Refills: 1 | Status: SHIPPED | OUTPATIENT
Start: 2023-05-15

## 2023-05-17 ENCOUNTER — ROUTINE PRENATAL (OUTPATIENT)
Dept: OBGYN CLINIC | Age: 23
End: 2023-05-17

## 2023-05-17 VITALS — BODY MASS INDEX: 36.15 KG/M2 | SYSTOLIC BLOOD PRESSURE: 112 MMHG | DIASTOLIC BLOOD PRESSURE: 62 MMHG | WEIGHT: 224 LBS

## 2023-05-17 DIAGNOSIS — O44.20 PLACENTA MARGINALIS, ANTEPARTUM: ICD-10-CM

## 2023-05-17 DIAGNOSIS — Q51.28 UTERUS DIDELPHYS: ICD-10-CM

## 2023-05-17 DIAGNOSIS — O09.899 SINGLE UMBILICAL ARTERY, MATERNAL, ANTEPARTUM: ICD-10-CM

## 2023-05-17 DIAGNOSIS — Z3A.31 31 WEEKS GESTATION OF PREGNANCY: Primary | ICD-10-CM

## 2023-05-17 DIAGNOSIS — O09.90 HIGH RISK PREGNANCY, ANTEPARTUM: ICD-10-CM

## 2023-05-17 PROCEDURE — 0502F SUBSEQUENT PRENATAL CARE: CPT | Performed by: OBSTETRICS & GYNECOLOGY

## 2023-05-17 SDOH — ECONOMIC STABILITY: HOUSING INSECURITY
IN THE LAST 12 MONTHS, WAS THERE A TIME WHEN YOU DID NOT HAVE A STEADY PLACE TO SLEEP OR SLEPT IN A SHELTER (INCLUDING NOW)?: NO

## 2023-05-17 SDOH — ECONOMIC STABILITY: FOOD INSECURITY: WITHIN THE PAST 12 MONTHS, THE FOOD YOU BOUGHT JUST DIDN'T LAST AND YOU DIDN'T HAVE MONEY TO GET MORE.: NEVER TRUE

## 2023-05-17 SDOH — ECONOMIC STABILITY: FOOD INSECURITY: WITHIN THE PAST 12 MONTHS, YOU WORRIED THAT YOUR FOOD WOULD RUN OUT BEFORE YOU GOT MONEY TO BUY MORE.: NEVER TRUE

## 2023-05-17 SDOH — ECONOMIC STABILITY: INCOME INSECURITY: HOW HARD IS IT FOR YOU TO PAY FOR THE VERY BASICS LIKE FOOD, HOUSING, MEDICAL CARE, AND HEATING?: NOT HARD AT ALL

## 2023-05-17 ASSESSMENT — SOCIAL DETERMINANTS OF HEALTH (SDOH)
WITHIN THE LAST YEAR, HAVE TO BEEN RAPED OR FORCED TO HAVE ANY KIND OF SEXUAL ACTIVITY BY YOUR PARTNER OR EX-PARTNER?: NO
WITHIN THE LAST YEAR, HAVE YOU BEEN HUMILIATED OR EMOTIONALLY ABUSED IN OTHER WAYS BY YOUR PARTNER OR EX-PARTNER?: NO
WITHIN THE LAST YEAR, HAVE YOU BEEN KICKED, HIT, SLAPPED, OR OTHERWISE PHYSICALLY HURT BY YOUR PARTNER OR EX-PARTNER?: NO
WITHIN THE LAST YEAR, HAVE YOU BEEN AFRAID OF YOUR PARTNER OR EX-PARTNER?: NO

## 2023-05-17 NOTE — PROGRESS NOTES
Noe Summers is a 20 y.o. female 31w2d        OB History    Para Term  AB Living   1 0 0 0 0 0   SAB IAB Ectopic Molar Multiple Live Births   0 0 0 0 0 0      # Outcome Date GA Lbr Colin/2nd Weight Sex Delivery Anes PTL Lv   1 Current              Vitals  BP: 112/62  Weight - Scale: 224 lb (101.6 kg)  Patient Position: Sitting  Fundal Height (cm): 30 cm  Fetal HR: 131  Movement: Present    The patient was seen and evaluated. There was positive fetal movements. No contractions or leakage of fluid. Signs and symptoms of  labor as well as labor were reviewed. The S/S of Pre-Eclampsia were reviewed with the patient in detail. She is to report any of these if they occur. She currently denies any of these. The patient had her 28 week labs completed. No visits with results within 5 Week(s) from this visit.    Latest known visit with results is:   Hospital Outpatient Visit on 2023   Component Date Value Ref Range Status    Date of Birth 2023 [id number]   Final    Maternal Weight 2023 218   Final    Patient Weight Units 2023 LBS   Final    Due Date 2023 SEE NOTE   Final    Comment: Results for Estimated Due Date: 23       Determined by 2023 Ultrasound   Final    Last Menstrual Period 2023 [id number]   Final    Monochorionic Twins 2023 INFORMATION NOT PROVIDED   Final    Race (Maternal) 2023 WHITE   Final    Diabetic 2023 N   Final    Smoking 2023 N   Final    Valproic/Carbamazep 2023 INFORMATION NOT PROVIDED   Final    Fam HX NTD 2023 INFORMATION NOT PROVIDED   Final    In Vitro Fertilization 2023 INFORMATION NOT PROVIDED   Final    Donor Egg? 2023 INFORMATION NOT PROVIDED   Final    Repeat Specimen? 2023 INFORMATION NOT PROVIDED   Final    AFP (Alpha Fetoprotein) 2023 89  ng/mL Final    AFP Mom 2023 0.95   Final    AFP Interp 2023 Screen Neg   Final    Comment:

## 2023-05-18 ENCOUNTER — CARE COORDINATION (OUTPATIENT)
Dept: OTHER | Facility: CLINIC | Age: 23
End: 2023-05-18

## 2023-05-18 NOTE — CARE COORDINATION
Ambulatory Care Coordination Note    ACM attempted to reach patient to Associate Care Management related to Zuleika Gamboa follow-up call. HIPAA compliant message left requesting a return phone call at patient convenience. Plan for follow-up call in 7-10 days      Future Appointments   Date Time Provider Dez Monreal   5/26/2023  9:00 AM P [facility]   5/26/2023  9:30 AM [facility] Schmitt [facility]   6/5/2023  2:00 PM [facility]   6/5/2023  2:30 PM KAR Amaya CNP [facility] ANNETTE Kay, RN  Associate Care Manager   Cell: [phone]  [email or web address]

## 2023-05-26 ENCOUNTER — ROUTINE PRENATAL (OUTPATIENT)
Dept: OBGYN CLINIC | Age: 23
End: 2023-05-26

## 2023-05-26 VITALS — SYSTOLIC BLOOD PRESSURE: 116 MMHG | DIASTOLIC BLOOD PRESSURE: 68 MMHG | BODY MASS INDEX: 36.07 KG/M2 | WEIGHT: 223.5 LBS

## 2023-05-26 DIAGNOSIS — O09.90 HIGH RISK PREGNANCY, ANTEPARTUM: Primary | ICD-10-CM

## 2023-05-26 DIAGNOSIS — O09.899 SINGLE UMBILICAL ARTERY, MATERNAL, ANTEPARTUM: ICD-10-CM

## 2023-05-26 DIAGNOSIS — Z3A.32 32 WEEKS GESTATION OF PREGNANCY: ICD-10-CM

## 2023-05-26 DIAGNOSIS — O44.20 PLACENTA MARGINALIS, ANTEPARTUM: ICD-10-CM

## 2023-05-26 DIAGNOSIS — O99.210 OBESITY IN PREGNANCY: ICD-10-CM

## 2023-05-26 DIAGNOSIS — Q51.28 UTERUS DIDELPHYS: ICD-10-CM

## 2023-05-26 ASSESSMENT — ANXIETY QUESTIONNAIRES
GAD7 TOTAL SCORE: 0
7. FEELING AFRAID AS IF SOMETHING AWFUL MIGHT HAPPEN: 0
3. WORRYING TOO MUCH ABOUT DIFFERENT THINGS: 0
5. BEING SO RESTLESS THAT IT IS HARD TO SIT STILL: 0
IF YOU CHECKED OFF ANY PROBLEMS ON THIS QUESTIONNAIRE, HOW DIFFICULT HAVE THESE PROBLEMS MADE IT FOR YOU TO DO YOUR WORK, TAKE CARE OF THINGS AT HOME, OR GET ALONG WITH OTHER PEOPLE: NOT DIFFICULT AT ALL
6. BECOMING EASILY ANNOYED OR IRRITABLE: 0
2. NOT BEING ABLE TO STOP OR CONTROL WORRYING: 0
4. TROUBLE RELAXING: 0
1. FEELING NERVOUS, ANXIOUS, OR ON EDGE: 0

## 2023-05-26 ASSESSMENT — PATIENT HEALTH QUESTIONNAIRE - PHQ9
SUM OF ALL RESPONSES TO PHQ QUESTIONS 1-9: 0
2. FEELING DOWN, DEPRESSED OR HOPELESS: 0
1. LITTLE INTEREST OR PLEASURE IN DOING THINGS: 0
SUM OF ALL RESPONSES TO PHQ9 QUESTIONS 1 & 2: 0

## 2023-05-26 NOTE — PROGRESS NOTES
SUBJECTIVE:    Parveen Bourgeois is here for her return OB visit. denies concerns today. She reports  feeling fetal movement. She denies vaginal bleeding. She denies vaginal discharge. She denies leaking of fluid. She denies uterine cramping. She denies  nausea and/or vomiting. OBJECTIVE:  Blood pressure 116/68, weight 223 lb 8 oz (101.4 kg), not currently breastfeeding. Total weight gain: 11 lb 8 oz (5.216 kg)  NST:   Baseline:  130  Variability:  Moderate  Accelerations:  Present  Decelerations: Absent   Fetal Movement:  Perceived by patient during NST  Contractions: patient denies contractions, contractions Absent  on toco.  Interpretation: Reactive (Wilhemenia Tino)     BPP completed during appointment: Yes and 8/8      Vaccinations:   Patient is declining tdap during the pregnancy    ASSESSMENT/PLAN:  IUP @ 32+4 weeks  S=D    High Risk Pregnancy  Due date is based on LMP and confirmed with 7w3d early dating ultrasound  Patient's prenatal labs are completed. Patient's blood type A POSITIVE  and rhogam is not indicated in the pregnancy  Early glucola indicated: yes - 101  Patient accepted genetic screening. Msafp was ordered: completed negative. NIPS pending  Anatomy scan scheduled 2/28/23 completed. Placenta posterior,normal cord insertion: MARGINAL cord insertion, cervical length 4.25cm, no low lying, no previa noted. Follow up in 2 weeks for completion of anatomy and CL. 2 vessel umbilical cord. 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 3/13/23 CL 4.01cm  3/27/23 EFW 55% CL 34-37mm  4/24/23 EFW 55% marginal cord insertion. CL 38-39mm. 2 vessel umbilical cord/single umbilical artery again noted. F/up in 4 weeks. Scheduled 5/26/23 4/24/23 fetal echo- no obvious evidence of congenital heart defects present today  Glucola between 24-28 weeks. ordered, 1 hour, completed and passed 1 hour glucose testing. Results 107. Continue with routine prenatal care no further testing is needed.   Total weight gain in

## 2023-06-01 ENCOUNTER — CARE COORDINATION (OUTPATIENT)
Dept: OTHER | Facility: CLINIC | Age: 23
End: 2023-06-01

## 2023-06-01 NOTE — CARE COORDINATION
Ambulatory Care Coordination Note    ACM attempted 2nd outreach to patient for Associate Care Management related to Maternity CM follow-up call. HIPAA compliant message left requesting a return phone call at patient convenience. Unable to Reach Letter sent to patient via [employer]. Will continue to outreach. Future Appointments   Date Time Provider Dez Monreal   6/5/2023  1:30 PM [facility] [place] [facility] Fatuma Rivera   6/5/2023  2:00 PM Qian Stanford APRN - [facility]   6/5/2023  2:30 PM Uyen Wallis APRN - [facility] ANNETTE Lewis, RN  Associate Care Manager   Cell: [phone]  [email or web address]

## 2023-06-05 ENCOUNTER — HOSPITAL ENCOUNTER (OUTPATIENT)
Age: 23
Setting detail: SPECIMEN
Discharge: HOME | End: 2023-06-05

## 2023-06-05 ENCOUNTER — ROUTINE PRENATAL (OUTPATIENT)
Dept: OBGYN CLINIC | Age: 23
End: 2023-06-05
Payer: COMMERCIAL

## 2023-06-05 ENCOUNTER — OFFICE VISIT (OUTPATIENT)
Dept: PRIMARY CARE CLINIC | Age: 23
End: 2023-06-05
Payer: COMMERCIAL

## 2023-06-05 VITALS — SYSTOLIC BLOOD PRESSURE: 118 MMHG | WEIGHT: 222 LBS | BODY MASS INDEX: 35.83 KG/M2 | DIASTOLIC BLOOD PRESSURE: 70 MMHG

## 2023-06-05 VITALS
SYSTOLIC BLOOD PRESSURE: 112 MMHG | DIASTOLIC BLOOD PRESSURE: 74 MMHG | RESPIRATION RATE: 14 BRPM | OXYGEN SATURATION: 100 % | HEIGHT: 66 IN | HEART RATE: 65 BPM | WEIGHT: 222 LBS | BODY MASS INDEX: 35.68 KG/M2

## 2023-06-05 DIAGNOSIS — Z3A.34 34 WEEKS GESTATION OF PREGNANCY: ICD-10-CM

## 2023-06-05 DIAGNOSIS — O44.20 PLACENTA MARGINALIS, ANTEPARTUM: ICD-10-CM

## 2023-06-05 DIAGNOSIS — O46.93 VAGINAL BLEEDING IN PREGNANCY, THIRD TRIMESTER: ICD-10-CM

## 2023-06-05 DIAGNOSIS — Q51.28 UTERUS DIDELPHYS: ICD-10-CM

## 2023-06-05 DIAGNOSIS — O09.899 SINGLE UMBILICAL ARTERY, MATERNAL, ANTEPARTUM: ICD-10-CM

## 2023-06-05 DIAGNOSIS — Z00.00 ANNUAL PHYSICAL EXAM: Primary | ICD-10-CM

## 2023-06-05 DIAGNOSIS — Z12.4 CERVICAL CANCER SCREENING: ICD-10-CM

## 2023-06-05 DIAGNOSIS — O09.90 HIGH RISK PREGNANCY, ANTEPARTUM: Primary | ICD-10-CM

## 2023-06-05 DIAGNOSIS — O99.210 OBESITY IN PREGNANCY: ICD-10-CM

## 2023-06-05 LAB
BACTERIA URNS QL MICRO: ABNORMAL
BILIRUB UR QL STRIP: NEGATIVE
CANDIDA SPECIES, DNA PROBE: POSITIVE
CASTS #/AREA URNS LPF: ABNORMAL /LPF (ref 0–8)
CLARITY UR: ABNORMAL
COLOR UR: YELLOW
EPI CELLS #/AREA URNS HPF: ABNORMAL /HPF (ref 0–5)
GARDNERELLA VAGINALIS, DNA PROBE: POSITIVE
GLUCOSE UR STRIP-MCNC: NEGATIVE MG/DL
HGB UR QL STRIP.AUTO: NEGATIVE
KETONES UR STRIP-MCNC: ABNORMAL MG/DL
LEUKOCYTE ESTERASE UR QL STRIP: ABNORMAL
NITRITE UR QL STRIP: NEGATIVE
PH UR STRIP: 7.5 [PH] (ref 5–8)
PROT UR STRIP-MCNC: ABNORMAL MG/DL
RBC #/AREA URNS HPF: ABNORMAL /HPF (ref 0–4)
SOURCE: ABNORMAL
SP GR UR STRIP: 1.01 (ref 1–1.03)
TRICHOMONAS VAGINALIS DNA: NEGATIVE
UROBILINOGEN UR STRIP-ACNC: NORMAL
WBC #/AREA URNS HPF: ABNORMAL /HPF (ref 0–5)

## 2023-06-05 PROCEDURE — 99395 PREV VISIT EST AGE 18-39: CPT | Performed by: NURSE PRACTITIONER

## 2023-06-05 PROCEDURE — 59025 FETAL NON-STRESS TEST: CPT | Performed by: NURSE PRACTITIONER

## 2023-06-05 PROCEDURE — 0502F SUBSEQUENT PRENATAL CARE: CPT | Performed by: NURSE PRACTITIONER

## 2023-06-05 RX ORDER — FLUCONAZOLE 150 MG/1
150 TABLET ORAL ONCE
Qty: 1 TABLET | Refills: 0 | Status: SHIPPED | OUTPATIENT
Start: 2023-06-05 | End: 2023-06-05

## 2023-06-05 RX ORDER — METRONIDAZOLE 7.5 MG/G
1 GEL VAGINAL DAILY
Qty: 70 G | Refills: 0 | Status: SHIPPED | OUTPATIENT
Start: 2023-06-05 | End: 2023-06-12

## 2023-06-05 ASSESSMENT — ENCOUNTER SYMPTOMS
DIARRHEA: 0
SINUS PRESSURE: 0
WHEEZING: 0
SINUS PAIN: 0
EYE DISCHARGE: 0
ABDOMINAL PAIN: 0
VOMITING: 0
EYE ITCHING: 0
NAUSEA: 0
COUGH: 0
SHORTNESS OF BREATH: 0
CHEST TIGHTNESS: 0
EYE REDNESS: 0
SORE THROAT: 0
TROUBLE SWALLOWING: 0

## 2023-06-05 ASSESSMENT — PATIENT HEALTH QUESTIONNAIRE - PHQ9
SUM OF ALL RESPONSES TO PHQ QUESTIONS 1-9: 0
SUM OF ALL RESPONSES TO PHQ9 QUESTIONS 1 & 2: 0
1. LITTLE INTEREST OR PLEASURE IN DOING THINGS: 0
SUM OF ALL RESPONSES TO PHQ QUESTIONS 1-9: 0
2. FEELING DOWN, DEPRESSED OR HOPELESS: 0

## 2023-06-05 NOTE — PROGRESS NOTES
orders of the defined types were placed in this encounter. Patient given educational materials - seepatient instructions. Discussed use, benefit, and side effects of prescribed medications. All patient questions answered. Pt voiced understanding. Reviewed health maintenance. Instructed to continue current medications, diet and exercise. Patient agreedwith treatment plan. Follow up as directed.       Electronically signed by KAR Simental CNP on 6/5/2023at 7:25 PM

## 2023-06-05 NOTE — PATIENT INSTRUCTIONS
rest, be sure to stay off your feet and rest as much as possible. Keep a phone, phone book, notepad, and pen near the bed where you can easily reach them. Gently stretch your legs every hour to maintain good blood flow. Have another family member pack snacks and lunch food in a cooler close to your bed. Use this time for activities that you usually cannot find time for, such as reading, craft projects, or letter writing. You can keep track of your baby's health by noting the length of time it takes to count 10 movements (such as kicks, flutters, or rolls). Feeling 10 movements in less than 1 hour is considered normal. Track your baby's movements once each day. Bring this record with you to each prenatal visit. When should you call for help? Call 911 anytime you think you may need emergency care. For example, call if:    You passed out (lost consciousness). You have a seizure. Call your doctor now or seek immediate medical care if:    You have symptoms of preeclampsia, such as:  Sudden swelling of your face, hands, or feet. New vision problems (such as dimness or blurring). A severe headache. Your blood pressure is higher than it should be, or it rises suddenly. You have new nausea or vomiting. You think that you are in labor. You have pain in your belly or pelvis. Watch closely for changes in your health, and be sure to contact your doctor if:    You gain weight rapidly. Where can you learn more? Go to [email or web address]"[employer]"pe[place] Park Illumination.[employer]. org and sign in to your [employer] account. Enter C379 in the [facility] box to learn more about \"Preeclampsia: Care Instructions. \"     If you do not have an account, please click on the \"Sign Up Now\" link. Current as of: September 5, 2018  Content Version: 12.0  © 1849-9235 [employer]. Care instructions adapted under license by [facility] ([facility]).  If you have questions about a medical condition or this instruction, always

## 2023-06-05 NOTE — PROGRESS NOTES
notify us immediately and  go into L &D for evaluation   - Culture, Urine; Future  - Urinalysis; Future  - Vaginitis DNA Probe; Future  - PAP SMEAR; Future  - Chlamydia Trachomatis & Neisseria gonorrhoeae (GC) by amplified detection; Future    8. Breech presentation, single or unspecified fetus  -she is agreeable to c/s if breech d/t her uterine abnormality     9. Cervical cancer screening    - PAP SMEAR; Future        Reactive NST    Keep scheduled fetal surveillance appointment  Continue Fetal Kick Counts     Of the 30 minute duration appointment visit, Latrell Evans CNP spent at least 50% of the face-to-face time in counseling, explanation of diagnosis, planning of further management, and answering all questions.

## 2023-06-06 LAB
C TRACH DNA SPEC QL PROBE+SIG AMP: NEGATIVE
MICROORGANISM SPEC CULT: NORMAL
N GONORRHOEA DNA SPEC QL PROBE+SIG AMP: NEGATIVE
SPECIMEN DESCRIPTION: NORMAL
SPECIMEN DESCRIPTION: NORMAL

## 2023-06-09 LAB — CYTOLOGY REPORT: NORMAL

## 2023-06-22 ENCOUNTER — ROUTINE PRENATAL (OUTPATIENT)
Dept: OBGYN CLINIC | Age: 23
End: 2023-06-22

## 2023-06-22 VITALS — BODY MASS INDEX: 36.83 KG/M2 | WEIGHT: 228.2 LBS | SYSTOLIC BLOOD PRESSURE: 116 MMHG | DIASTOLIC BLOOD PRESSURE: 74 MMHG

## 2023-06-22 DIAGNOSIS — O09.899 SINGLE UMBILICAL ARTERY, MATERNAL, ANTEPARTUM: ICD-10-CM

## 2023-06-22 DIAGNOSIS — O09.90 HIGH RISK PREGNANCY, ANTEPARTUM: Primary | ICD-10-CM

## 2023-06-22 DIAGNOSIS — Z3A.36 36 WEEKS GESTATION OF PREGNANCY: ICD-10-CM

## 2023-06-22 DIAGNOSIS — Q51.28 UTERUS DIDELPHYS: ICD-10-CM

## 2023-06-22 DIAGNOSIS — O44.20 PLACENTA MARGINALIS, ANTEPARTUM: ICD-10-CM

## 2023-06-22 NOTE — PATIENT INSTRUCTIONS
After Hours Number: You can call the office [phone]  or [phone]  Call if you have:  1. Bleeding like a period  2. Cramps or contractions greater than 2 hours  3. If you are leaking fluid  4. If you've a fever greater than 100°  5. If you feel as if baby is not moving  6. If you have continuous vomiting over 3-4 hours   Counting Your Baby's Kicks: Care Instructions  Your Care Instructions    Counting your baby's kicks is one way your doctor can tell that your baby is healthy. Most women--especially in a first pregnancy--feel their baby move for the first time between 16 and 22 weeks. The movement may feel like flutters rather than kicks. Your baby may move more at certain times of the day. When you are active, you may notice less kicking than when you are resting. At your prenatal visits, your doctor will ask whether the baby is active. In your last trimester, your doctor may ask you to count the number of times you feel your baby move. Follow-up care is a key part of your treatment and safety. Be sure to make and go to all appointments, and call your doctor if you are having problems. It's also a good idea to know your test results and keep a list of the medicines you take. How do you count fetal kicks? A common method of checking your baby's movement is to count the number of kicks or moves you feel in 1 hour. Ten movements (such as kicks, flutters, or rolls) in 1 hour are normal. Some doctors suggest that you count in the morning until you get to 10 movements. Then you can quit for that day and start again the next day. Pick your baby's most active time of day to count. This may be any time from morning to evening. If you do not feel 10 movements in an hour, your baby may be sleeping. Wait for the next hour and count again. When should you call for help?   Call your doctor now or seek immediate medical care if:    You noticed that your baby has stopped moving or is moving much less than

## 2023-06-22 NOTE — PROGRESS NOTES
S:  Here for NST for fetal surveillance secondary to single umbilical artery, placenta marginalis, uterus didelphys. Patient is currently enrolled in [address] [place] [address] Episode  Start day 4/6/2023  Risk for Admission or ED Visit: high  Please see patient outreach encounters for further details. For questions contact:   ANNETTE Sweeney, RN  Associate Care Manager   Cell: [phone]  [email or web address]            Insurance [employer]    7/10/23 10 am PLTCS DR CASAREZ     IPV bag/mug given: 12/8/22    G1P  Knows gender- girl  Plans to deliver: Anusha Macdonald Edgar 42    1st trimester screen/NIPs: unsure    AFP negative    Fetal Echo scheduled 4/24/23    High Risk:  Uterus didelphys  - pregnancy in left uterus   - breech- agreeable to c/s  Vaginal bleeding in pregnancy- cultures PAP cytology pending as of 6/5/8001    Single umbilical artery  - MFM recommend FS at 32 weeks  - Fetal echo at 26 weeks  Placenta marginalis  - MFM recommends FS at 32 weeks  Obesity  - Early glucola WNL  - Not taking baby ASA 4/4/2023    Early 1 hr GTT: Yes - passed 101 on 1/19/23    Covid Vaccine: No- Declines    Flu Vaccine: 11/17/22    Tdap: declined 5/17/23    Rhogam: A positive    1hr GTT: 107    3hr GTT:    GBS:    2nd trimester appointment with Dr. Fransisca Bain :     3rd trimester appointment with Dr. Fransisca Bain :     EPDS/ Anxiety screening done 4/4/2023      Headaches: no  Swelling: no  Bleeding: no, resolved after tx BV and candida  Discharge: no   Dysuria: no  Nausea: no  Heartburn: no  Epigastric pain: no  Contractions: no  Leakage of fluid: no  + fetal movement      O:  Vitals:    06/22/23 1122   BP: 116/74   Weight: 228 lb 3.2 oz (103.5 kg)        Baseline:  135    Variability:  Moderate  Accelerations:  Present  Decelerations:  Absent  Fetal Movement:  Perceived by patient during NST  Contractions:  Absent          A/P:    1.  High risk pregnancy, antepartum  rNST, BPP 8/8  - 86871 - CT FETAL NON-STRESS TEST    2. 36 weeks

## 2023-06-30 ENCOUNTER — ROUTINE PRENATAL (OUTPATIENT)
Dept: OBGYN CLINIC | Age: 23
End: 2023-06-30

## 2023-06-30 ENCOUNTER — HOSPITAL ENCOUNTER (OUTPATIENT)
Age: 23
Setting detail: SPECIMEN
Discharge: HOME | End: 2023-06-30

## 2023-06-30 VITALS — DIASTOLIC BLOOD PRESSURE: 80 MMHG | BODY MASS INDEX: 36.99 KG/M2 | SYSTOLIC BLOOD PRESSURE: 116 MMHG | WEIGHT: 229.2 LBS

## 2023-06-30 DIAGNOSIS — Z3A.37 37 WEEKS GESTATION OF PREGNANCY: ICD-10-CM

## 2023-06-30 DIAGNOSIS — O36.5990 FETAL GROWTH RESTRICTION ANTEPARTUM: ICD-10-CM

## 2023-06-30 DIAGNOSIS — Q51.28 UTERUS DIDELPHYS: ICD-10-CM

## 2023-06-30 DIAGNOSIS — O43.199 MARGINAL INSERTION OF UMBILICAL CORD AFFECTING MANAGEMENT OF MOTHER: ICD-10-CM

## 2023-06-30 DIAGNOSIS — O35.07X0 FETAL MICROCEPHALY: ICD-10-CM

## 2023-06-30 DIAGNOSIS — Q27.0 SINGLE UMBILICAL ARTERY: ICD-10-CM

## 2023-06-30 DIAGNOSIS — O99.210 OBESITY IN PREGNANCY: ICD-10-CM

## 2023-06-30 DIAGNOSIS — O09.93 HIGH-RISK PREGNANCY IN THIRD TRIMESTER: Primary | ICD-10-CM

## 2023-07-02 PROBLEM — R63.39 PICKY EATER: Status: RESOLVED | Noted: 2020-02-07 | Resolved: 2023-07-02

## 2023-07-02 PROBLEM — L05.91 PILONIDAL CYST: Status: RESOLVED | Noted: 2019-05-08 | Resolved: 2023-07-02

## 2023-07-02 PROBLEM — Z3A.38 38 WEEKS GESTATION OF PREGNANCY: Status: ACTIVE | Noted: 2023-07-02

## 2023-07-02 LAB
MICROORGANISM SPEC CULT: ABNORMAL
SPECIMEN DESCRIPTION: ABNORMAL

## 2023-07-03 ENCOUNTER — HOSPITAL ENCOUNTER (INPATIENT)
Age: 23
LOS: 2 days | Discharge: HOME | End: 2023-07-05
Attending: OBSTETRICS & GYNECOLOGY | Admitting: OBSTETRICS & GYNECOLOGY
Payer: COMMERCIAL

## 2023-07-03 ENCOUNTER — ANESTHESIA (OUTPATIENT)
Dept: LABOR AND DELIVERY | Age: 23
End: 2023-07-03
Payer: COMMERCIAL

## 2023-07-03 ENCOUNTER — ANESTHESIA EVENT (OUTPATIENT)
Dept: LABOR AND DELIVERY | Age: 23
End: 2023-07-03
Payer: COMMERCIAL

## 2023-07-03 DIAGNOSIS — Z98.891 HISTORY OF CESAREAN SECTION: Primary | ICD-10-CM

## 2023-07-03 PROBLEM — O36.5990 FETAL GROWTH RESTRICTION ANTEPARTUM: Status: ACTIVE | Noted: 2023-07-03

## 2023-07-03 LAB
ABO + RH BLD: NORMAL
AMPHET UR QL SCN: NEGATIVE
ARM BAND NUMBER: NORMAL
BARBITURATES UR QL SCN: NEGATIVE
BENZODIAZ UR QL: NEGATIVE
BLOOD GROUP ANTIBODIES SERPL: NEGATIVE
CANNABINOIDS UR QL SCN: NEGATIVE
COCAINE UR QL SCN: NEGATIVE
ERYTHROCYTE [DISTWIDTH] IN BLOOD BY AUTOMATED COUNT: 13.2 % (ref 11.8–14.4)
EXPIRATION DATE: NORMAL
FENTANYL UR QL: NEGATIVE
HCT VFR BLD AUTO: 33.5 % (ref 36.3–47.1)
HGB BLD-MCNC: 10.2 G/DL (ref 11.9–15.1)
MCH RBC QN AUTO: 25.4 PG (ref 25.2–33.5)
MCHC RBC AUTO-ENTMCNC: 30.4 G/DL (ref 28.4–34.8)
MCV RBC AUTO: 83.3 FL (ref 82.6–102.9)
METHADONE UR QL: NEGATIVE
NRBC BLD-RTO: 0 PER 100 WBC
OPIATES UR QL SCN: NEGATIVE
OXYCODONE UR QL SCN: NEGATIVE
PCP UR QL SCN: NEGATIVE
PLATELET # BLD AUTO: 259 K/UL (ref 138–453)
PMV BLD AUTO: 10.8 FL (ref 8.1–13.5)
RBC # BLD AUTO: 4.02 M/UL (ref 3.95–5.11)
T PALLIDUM AB SER QL IA: NONREACTIVE
TEST INFORMATION: NORMAL
WBC OTHER # BLD: 12.6 K/UL (ref 3.5–11.3)

## 2023-07-03 PROCEDURE — 6360000002 HC RX W HCPCS

## 2023-07-03 PROCEDURE — 2580000003 HC RX 258

## 2023-07-03 PROCEDURE — 6370000000 HC RX 637 (ALT 250 FOR IP)

## 2023-07-03 PROCEDURE — 2709999900 HC NON-CHARGEABLE SUPPLY: Performed by: OBSTETRICS & GYNECOLOGY

## 2023-07-03 PROCEDURE — 86780 TREPONEMA PALLIDUM: CPT

## 2023-07-03 PROCEDURE — 3700000000 HC ANESTHESIA ATTENDED CARE: Performed by: OBSTETRICS & GYNECOLOGY

## 2023-07-03 PROCEDURE — 1220000000 HC SEMI PRIVATE OB R&B

## 2023-07-03 PROCEDURE — 85027 COMPLETE CBC AUTOMATED: CPT

## 2023-07-03 PROCEDURE — 7100000000 HC PACU RECOVERY - FIRST 15 MIN: Performed by: OBSTETRICS & GYNECOLOGY

## 2023-07-03 PROCEDURE — 59510 CESAREAN DELIVERY: CPT | Performed by: OBSTETRICS & GYNECOLOGY

## 2023-07-03 PROCEDURE — 3609079900 HC CESAREAN SECTION: Performed by: OBSTETRICS & GYNECOLOGY

## 2023-07-03 PROCEDURE — 3700000001 HC ADD 15 MINUTES (ANESTHESIA): Performed by: OBSTETRICS & GYNECOLOGY

## 2023-07-03 PROCEDURE — A4216 STERILE WATER/SALINE, 10 ML: HCPCS

## 2023-07-03 PROCEDURE — 86901 BLOOD TYPING SEROLOGIC RH(D): CPT

## 2023-07-03 PROCEDURE — 2500000003 HC RX 250 WO HCPCS

## 2023-07-03 PROCEDURE — 36415 COLL VENOUS BLD VENIPUNCTURE: CPT

## 2023-07-03 PROCEDURE — 86900 BLOOD TYPING SEROLOGIC ABO: CPT

## 2023-07-03 PROCEDURE — 80307 DRUG TEST PRSMV CHEM ANLYZR: CPT

## 2023-07-03 PROCEDURE — 7100000001 HC PACU RECOVERY - ADDTL 15 MIN: Performed by: OBSTETRICS & GYNECOLOGY

## 2023-07-03 PROCEDURE — 86850 RBC ANTIBODY SCREEN: CPT

## 2023-07-03 RX ORDER — ACETAMINOPHEN 325 MG/1
975 TABLET ORAL ONCE
Status: COMPLETED | OUTPATIENT
Start: 2023-07-03 | End: 2023-07-03

## 2023-07-03 RX ORDER — ONDANSETRON 2 MG/ML
4 INJECTION INTRAMUSCULAR; INTRAVENOUS EVERY 6 HOURS PRN
Status: DISCONTINUED | OUTPATIENT
Start: 2023-07-03 | End: 2023-07-03

## 2023-07-03 RX ORDER — DEXAMETHASONE SODIUM PHOSPHATE 10 MG/ML
INJECTION, SOLUTION INTRAMUSCULAR; INTRAVENOUS PRN
Status: DISCONTINUED | OUTPATIENT
Start: 2023-07-03 | End: 2023-07-03 | Stop reason: SDUPTHER

## 2023-07-03 RX ORDER — SODIUM CHLORIDE, SODIUM LACTATE, POTASSIUM CHLORIDE, CALCIUM CHLORIDE 600; 310; 30; 20 MG/100ML; MG/100ML; MG/100ML; MG/100ML
INJECTION, SOLUTION INTRAVENOUS CONTINUOUS
Status: DISCONTINUED | OUTPATIENT
Start: 2023-07-03 | End: 2023-07-03

## 2023-07-03 RX ORDER — SODIUM CHLORIDE 9 MG/ML
INJECTION, SOLUTION INTRAVENOUS PRN
Status: DISCONTINUED | OUTPATIENT
Start: 2023-07-03 | End: 2023-07-05 | Stop reason: HOSPADM

## 2023-07-03 RX ORDER — POLYETHYLENE GLYCOL 3350 17 G/17G
17 POWDER, FOR SOLUTION ORAL DAILY
Status: DISCONTINUED | OUTPATIENT
Start: 2023-07-03 | End: 2023-07-05 | Stop reason: HOSPADM

## 2023-07-03 RX ORDER — IBUPROFEN 600 MG/1
600 TABLET ORAL EVERY 6 HOURS PRN
Qty: 40 TABLET | Refills: 1 | Status: SHIPPED | OUTPATIENT
Start: 2023-07-03

## 2023-07-03 RX ORDER — SCOLOPAMINE TRANSDERMAL SYSTEM 1 MG/1
1 PATCH, EXTENDED RELEASE TRANSDERMAL
Status: DISCONTINUED | OUTPATIENT
Start: 2023-07-03 | End: 2023-07-03

## 2023-07-03 RX ORDER — SODIUM CHLORIDE 0.9 % (FLUSH) 0.9 %
10 SYRINGE (ML) INJECTION PRN
Status: DISCONTINUED | OUTPATIENT
Start: 2023-07-03 | End: 2023-07-03

## 2023-07-03 RX ORDER — ACETAMINOPHEN 500 MG
1000 TABLET ORAL EVERY 6 HOURS
Status: DISCONTINUED | OUTPATIENT
Start: 2023-07-03 | End: 2023-07-05 | Stop reason: HOSPADM

## 2023-07-03 RX ORDER — KETOROLAC TROMETHAMINE 30 MG/ML
30 INJECTION, SOLUTION INTRAMUSCULAR; INTRAVENOUS EVERY 6 HOURS
Status: COMPLETED | OUTPATIENT
Start: 2023-07-03 | End: 2023-07-04

## 2023-07-03 RX ORDER — OXYCODONE HYDROCHLORIDE 5 MG/1
5 TABLET ORAL EVERY 4 HOURS PRN
Status: DISCONTINUED | OUTPATIENT
Start: 2023-07-03 | End: 2023-07-05 | Stop reason: HOSPADM

## 2023-07-03 RX ORDER — SODIUM CHLORIDE 9 MG/ML
INJECTION, SOLUTION INTRAVENOUS PRN
Status: DISCONTINUED | OUTPATIENT
Start: 2023-07-03 | End: 2023-07-03

## 2023-07-03 RX ORDER — DIPHENHYDRAMINE HYDROCHLORIDE 50 MG/ML
25 INJECTION INTRAMUSCULAR; INTRAVENOUS EVERY 6 HOURS PRN
Status: DISCONTINUED | OUTPATIENT
Start: 2023-07-03 | End: 2023-07-05 | Stop reason: HOSPADM

## 2023-07-03 RX ORDER — SODIUM CHLORIDE 0.9 % (FLUSH) 0.9 %
5-40 SYRINGE (ML) INJECTION EVERY 12 HOURS SCHEDULED
Status: DISCONTINUED | OUTPATIENT
Start: 2023-07-03 | End: 2023-07-05 | Stop reason: HOSPADM

## 2023-07-03 RX ORDER — SODIUM CHLORIDE 0.9 % (FLUSH) 0.9 %
5-40 SYRINGE (ML) INJECTION PRN
Status: DISCONTINUED | OUTPATIENT
Start: 2023-07-03 | End: 2023-07-05 | Stop reason: HOSPADM

## 2023-07-03 RX ORDER — MORPHINE SULFATE 1 MG/ML
INJECTION, SOLUTION EPIDURAL; INTRATHECAL; INTRAVENOUS PRN
Status: DISCONTINUED | OUTPATIENT
Start: 2023-07-03 | End: 2023-07-03 | Stop reason: SDUPTHER

## 2023-07-03 RX ORDER — SENNA AND DOCUSATE SODIUM 50; 8.6 MG/1; MG/1
2 TABLET, FILM COATED ORAL 2 TIMES DAILY
Status: DISCONTINUED | OUTPATIENT
Start: 2023-07-03 | End: 2023-07-05 | Stop reason: HOSPADM

## 2023-07-03 RX ORDER — OXYCODONE HYDROCHLORIDE 5 MG/1
5 TABLET ORAL EVERY 6 HOURS PRN
Qty: 20 TABLET | Refills: 0 | Status: SHIPPED | OUTPATIENT
Start: 2023-07-03 | End: 2023-07-08

## 2023-07-03 RX ORDER — SODIUM CHLORIDE, SODIUM LACTATE, POTASSIUM CHLORIDE, AND CALCIUM CHLORIDE .6; .31; .03; .02 G/100ML; G/100ML; G/100ML; G/100ML
1000 INJECTION, SOLUTION INTRAVENOUS ONCE
Status: COMPLETED | OUTPATIENT
Start: 2023-07-03 | End: 2023-07-03

## 2023-07-03 RX ORDER — ONDANSETRON 2 MG/ML
INJECTION INTRAMUSCULAR; INTRAVENOUS PRN
Status: DISCONTINUED | OUTPATIENT
Start: 2023-07-03 | End: 2023-07-03 | Stop reason: SDUPTHER

## 2023-07-03 RX ORDER — ACETAMINOPHEN 500 MG
1000 TABLET ORAL EVERY 6 HOURS PRN
Qty: 30 TABLET | Refills: 1 | Status: SHIPPED | OUTPATIENT
Start: 2023-07-03

## 2023-07-03 RX ORDER — NALOXONE HYDROCHLORIDE 0.4 MG/ML
0.4 INJECTION, SOLUTION INTRAMUSCULAR; INTRAVENOUS; SUBCUTANEOUS PRN
Status: DISCONTINUED | OUTPATIENT
Start: 2023-07-03 | End: 2023-07-05 | Stop reason: HOSPADM

## 2023-07-03 RX ORDER — DIPHENHYDRAMINE HYDROCHLORIDE 50 MG/ML
INJECTION INTRAMUSCULAR; INTRAVENOUS PRN
Status: DISCONTINUED | OUTPATIENT
Start: 2023-07-03 | End: 2023-07-03 | Stop reason: SDUPTHER

## 2023-07-03 RX ORDER — VITAMIN A, ASCORBIC ACID, CHOLECALCIFEROL, .ALPHA.-TOCOPHEROL ACETATE, DL-, THIAMINE MONONITRATE, RIBOFLAVIN, NIACINAMIDE, PYRIDOXINE HYDROCHLORIDE, FOLIC ACID, CYANOCOBALAMIN, CALCIUM CARBONATE, IRON, ZINC OXIDE, AND CUPRIC OXIDE 4000; 120; 400; 22; 1.84; 3; 20; 10; 1; 12; 200; 29; 25; 2 [IU]/1; MG/1; [IU]/1; [IU]/1; MG/1; MG/1; MG/1; MG/1; MG/1; UG/1; MG/1; MG/1; MG/1; MG/1
1 TABLET ORAL DAILY
Status: DISCONTINUED | OUTPATIENT
Start: 2023-07-03 | End: 2023-07-05 | Stop reason: HOSPADM

## 2023-07-03 RX ORDER — IBUPROFEN 600 MG/1
600 TABLET ORAL EVERY 6 HOURS
Status: DISCONTINUED | OUTPATIENT
Start: 2023-07-04 | End: 2023-07-05 | Stop reason: HOSPADM

## 2023-07-03 RX ORDER — SIMETHICONE 80 MG
80 TABLET,CHEWABLE ORAL EVERY 6 HOURS PRN
Status: DISCONTINUED | OUTPATIENT
Start: 2023-07-03 | End: 2023-07-05 | Stop reason: HOSPADM

## 2023-07-03 RX ORDER — OXYCODONE HYDROCHLORIDE 5 MG/1
10 TABLET ORAL EVERY 4 HOURS PRN
Status: DISCONTINUED | OUTPATIENT
Start: 2023-07-03 | End: 2023-07-05 | Stop reason: HOSPADM

## 2023-07-03 RX ORDER — ENOXAPARIN SODIUM 100 MG/ML
40 INJECTION SUBCUTANEOUS DAILY
Status: DISCONTINUED | OUTPATIENT
Start: 2023-07-03 | End: 2023-07-05 | Stop reason: HOSPADM

## 2023-07-03 RX ORDER — KETOROLAC TROMETHAMINE 30 MG/ML
INJECTION, SOLUTION INTRAMUSCULAR; INTRAVENOUS PRN
Status: DISCONTINUED | OUTPATIENT
Start: 2023-07-03 | End: 2023-07-03 | Stop reason: SDUPTHER

## 2023-07-03 RX ORDER — LANOLIN 72 %
OINTMENT (GRAM) TOPICAL
Status: DISCONTINUED | OUTPATIENT
Start: 2023-07-03 | End: 2023-07-05 | Stop reason: HOSPADM

## 2023-07-03 RX ORDER — BUPIVACAINE HYDROCHLORIDE 7.5 MG/ML
INJECTION, SOLUTION INTRASPINAL PRN
Status: DISCONTINUED | OUTPATIENT
Start: 2023-07-03 | End: 2023-07-03 | Stop reason: SDUPTHER

## 2023-07-03 RX ORDER — ONDANSETRON 2 MG/ML
4 INJECTION INTRAMUSCULAR; INTRAVENOUS EVERY 6 HOURS PRN
Status: DISCONTINUED | OUTPATIENT
Start: 2023-07-03 | End: 2023-07-05 | Stop reason: HOSPADM

## 2023-07-03 RX ORDER — SENNA AND DOCUSATE SODIUM 50; 8.6 MG/1; MG/1
1 TABLET, FILM COATED ORAL 2 TIMES DAILY
Qty: 60 TABLET | Refills: 0 | Status: SHIPPED | OUTPATIENT
Start: 2023-07-03

## 2023-07-03 RX ORDER — TRISODIUM CITRATE DIHYDRATE AND CITRIC ACID MONOHYDRATE 500; 334 MG/5ML; MG/5ML
30 SOLUTION ORAL ONCE
Status: COMPLETED | OUTPATIENT
Start: 2023-07-03 | End: 2023-07-03

## 2023-07-03 RX ADMIN — DOCUSATE SODIUM 50 MG AND SENNOSIDES 8.6 MG 2 TABLET: 8.6; 5 TABLET, FILM COATED ORAL at 13:30

## 2023-07-03 RX ADMIN — Medication 2000 MG: at 07:31

## 2023-07-03 RX ADMIN — DEXAMETHASONE SODIUM PHOSPHATE 10 MG: 10 INJECTION, SOLUTION INTRAMUSCULAR; INTRAVENOUS at 08:51

## 2023-07-03 RX ADMIN — ACETAMINOPHEN 975 MG: 325 TABLET ORAL at 07:32

## 2023-07-03 RX ADMIN — KETOROLAC TROMETHAMINE 30 MG: 30 INJECTION, SOLUTION INTRAMUSCULAR; INTRAVENOUS at 09:30

## 2023-07-03 RX ADMIN — SODIUM CHLORIDE, POTASSIUM CHLORIDE, SODIUM LACTATE AND CALCIUM CHLORIDE 1000 ML: 600; 310; 30; 20 INJECTION, SOLUTION INTRAVENOUS at 07:34

## 2023-07-03 RX ADMIN — SODIUM CITRATE AND CITRIC ACID MONOHYDRATE 30 ML: 500; 334 SOLUTION ORAL at 07:31

## 2023-07-03 RX ADMIN — KETOROLAC TROMETHAMINE 30 MG: 30 INJECTION, SOLUTION INTRAMUSCULAR; INTRAVENOUS at 21:15

## 2023-07-03 RX ADMIN — ACETAMINOPHEN 1000 MG: 500 TABLET ORAL at 13:29

## 2023-07-03 RX ADMIN — Medication 2000 MG: at 15:18

## 2023-07-03 RX ADMIN — Medication 2000 MG: at 23:15

## 2023-07-03 RX ADMIN — Medication 1 TABLET: at 13:29

## 2023-07-03 RX ADMIN — ONDANSETRON 4 MG: 2 INJECTION INTRAMUSCULAR; INTRAVENOUS at 08:14

## 2023-07-03 RX ADMIN — ACETAMINOPHEN 1000 MG: 500 TABLET ORAL at 19:26

## 2023-07-03 RX ADMIN — DOCUSATE SODIUM 50 MG AND SENNOSIDES 8.6 MG 2 TABLET: 8.6; 5 TABLET, FILM COATED ORAL at 21:10

## 2023-07-03 RX ADMIN — MORPHINE SULFATE 0.2 MG: 1 INJECTION, SOLUTION EPIDURAL; INTRATHECAL; INTRAVENOUS at 08:17

## 2023-07-03 RX ADMIN — ENOXAPARIN SODIUM 40 MG: 40 INJECTION SUBCUTANEOUS at 21:15

## 2023-07-03 RX ADMIN — SODIUM CHLORIDE 25 MCG/MIN: 9 INJECTION, SOLUTION INTRAVENOUS at 08:18

## 2023-07-03 RX ADMIN — KETOROLAC TROMETHAMINE 30 MG: 30 INJECTION, SOLUTION INTRAMUSCULAR; INTRAVENOUS at 15:18

## 2023-07-03 RX ADMIN — DIPHENHYDRAMINE HYDROCHLORIDE 25 MG: 50 INJECTION, SOLUTION INTRAMUSCULAR; INTRAVENOUS at 23:13

## 2023-07-03 RX ADMIN — SODIUM CHLORIDE, POTASSIUM CHLORIDE, SODIUM LACTATE AND CALCIUM CHLORIDE: 600; 310; 30; 20 INJECTION, SOLUTION INTRAVENOUS at 09:51

## 2023-07-03 RX ADMIN — ONDANSETRON 4 MG: 2 INJECTION INTRAMUSCULAR; INTRAVENOUS at 13:44

## 2023-07-03 RX ADMIN — SODIUM CHLORIDE, POTASSIUM CHLORIDE, SODIUM LACTATE AND CALCIUM CHLORIDE: 600; 310; 30; 20 INJECTION, SOLUTION INTRAVENOUS at 09:05

## 2023-07-03 RX ADMIN — BUPIVACAINE HYDROCHLORIDE IN DEXTROSE 1.7 ML: 7.5 INJECTION, SOLUTION SUBARACHNOID at 08:17

## 2023-07-03 RX ADMIN — Medication 909 ML/HR: at 08:49

## 2023-07-03 RX ADMIN — FAMOTIDINE 20 MG: 10 INJECTION, SOLUTION INTRAVENOUS at 07:31

## 2023-07-03 RX ADMIN — SODIUM CHLORIDE, POTASSIUM CHLORIDE, SODIUM LACTATE AND CALCIUM CHLORIDE: 600; 310; 30; 20 INJECTION, SOLUTION INTRAVENOUS at 18:30

## 2023-07-03 RX ADMIN — SODIUM CHLORIDE, POTASSIUM CHLORIDE, SODIUM LACTATE AND CALCIUM CHLORIDE: 600; 310; 30; 20 INJECTION, SOLUTION INTRAVENOUS at 08:10

## 2023-07-03 RX ADMIN — DIPHENHYDRAMINE HYDROCHLORIDE 12.5 MG: 50 INJECTION, SOLUTION INTRAMUSCULAR; INTRAVENOUS at 08:51

## 2023-07-03 ASSESSMENT — PAIN DESCRIPTION - DESCRIPTORS: DESCRIPTORS: CRAMPING;DISCOMFORT

## 2023-07-03 ASSESSMENT — PAIN DESCRIPTION - LOCATION: LOCATION: INCISION

## 2023-07-03 ASSESSMENT — PAIN SCALES - GENERAL
PAINLEVEL_OUTOF10: 0
PAINLEVEL_OUTOF10: 7

## 2023-07-03 ASSESSMENT — PAIN - FUNCTIONAL ASSESSMENT: PAIN_FUNCTIONAL_ASSESSMENT: ACTIVITIES ARE NOT PREVENTED

## 2023-07-03 ASSESSMENT — PAIN DESCRIPTION - ORIENTATION: ORIENTATION: LOWER

## 2023-07-03 NOTE — BRIEF OP NOTE
Department of Obstetrics and Gynecology  Obstetrical Brief Operative Report  [facility]    Patient: Allyson Birmingham   : 2000  MRN: [id number]       Acct: [de-identified]   Date of Procedure: 7/3/23    Pre-operative Diagnosis: 20 y.o. female  at 38w0d   Primary  Section 2/2 Breech Presentation   Fetal Growth Restriction   Uterine Didelphys   Two Vessel Cord   Marginal Cord Insertion   History of Anemia   Dysmenorrhea   BMI 36.9     Post-operative Diagnosis: Same as above and  living infant     Procedure: primary low transverse  section    Surgeon: Dr. Galo Mathews  Assistant(s): Singh Clay; Iwona Newsome MD, PGY2; Myla Kennedy, PGY1    Anesthesia: spinal with Duramorph    Information for the patient's :  Gildardo Joko Girl Meeta Suazo [[id number]]   female   Birth Weight: 6 lb 10.7 oz (3.025 kg)   Information for the patient's :  Deana Biggs [[id number]]      Findings:  Live Born 6#10 female infant in breech presentation with Apgars of 8 at 1 minute and 9 at five minutes. Normal appearing tubes and ovaries. Uterine didelphys. Estimated Blood Loss: 500ml immediately post-operatively  Total IV Fluids: 1400 ml  Urine output: 300ml clear urine   Drains:  lorenzo catheter  Specimens:  placenta discarded, cord blood, and cord gases  Instrument and Sponge Count:  Correct  Complications: none  Condition: Infant stable, transfer to [address] [place] [address], Mother stable, transfer to post anesthesia recovery    See dictated operative report for full details.     Iwona Newsome MD  Ob/Gyn Resident  7/3/2023, 9:55 AM

## 2023-07-03 NOTE — CARE COORDINATION
ANTEPARTUM NOTE    Breech presentation, single or unspecified fetus [O32.1XX0]  45 weeks gestation of pregnancy [Z3A.38]    Augie Garcia was admitted to L&D on 7/3/2023 for CS 2/2 breech presentation @ 38 0/7    OB GYN Provider: Dr Mike Mendoza    Will meet with patient after delivery to verify name/address/phone/insurance and discuss discharge planning. Anticipate DC home 2 nights after vaginal delivery or 4 nights after C/S delivery as long as hemodynamically stable.

## 2023-07-03 NOTE — CARE COORDINATION
Social Work     Sw reviewed medical record (current active problem list) and tox screens and found no current concerns. Sw spoke with mom briefly to explain Sw role, inquire if any needs or concerns, and provide safe sleep education and discuss. Mom denied any needs or questions and informs baby has a safe sleep environment (bassinet and crib). Mom denied any current s/s of anxiety or depression and is aware to reach out to OB if any s/s occur after dc. Mom reports a really good support system with her  who will be home for one week, and both sides of family, and denied any current questions or needs. Mom reports this is her 1st baby. Mom states ped will be [facility] in [place]. Sw encouraged mom to reach out if any issues or concerns arise.

## 2023-07-03 NOTE — ANESTHESIA PROCEDURE NOTES
Spinal Block    Patient location during procedure: OR  End time: 7/3/2023 8:17 AM  Reason for block: primary anesthetic  Staffing  Performed: resident/CRNA   Anesthesiologist: Inessa Deluna MD  Resident/CRNA: KAR Huerta CRNA  Spinal Block  Patient position: sitting  Prep: Betadine  Patient monitoring: continuous pulse ox and frequent blood pressure checks  Approach: midline  Location: L3/L4  Provider prep: mask and sterile gloves  Local infiltration: lidocaine  Needle  Needle type: Pencan   Needle gauge: 24 G  Needle length: 4 in  Assessment  Sensory level: T4  Swirl obtained: Yes  CSF: clear  Attempts: 1  Hemodynamics: stable  Preanesthetic Checklist  Completed: patient identified, IV checked, site marked, risks and benefits discussed, surgical/procedural consents, equipment checked, pre-op evaluation, timeout performed, anesthesia consent given, oxygen available, monitors applied/VS acknowledged, fire risk safety assessment completed and verbalized and blood product R/B/A discussed and consented

## 2023-07-03 NOTE — OP NOTE
Operative Note  Department of Obstetrics and Gynecology  [facility]     Patient: Shadia Kasper   : 2000  MRN: [id number]       Acct: [de-identified]   PCP: KAR Cohen CNP  Date of Procedure: 7/3/23    Pre-operative Diagnosis: 20 y.o. female  at 42w0d   Primary  Section 2/2 Breech Presentation   Fetal Growth Restriction   Uterine Didelphys   Two Vessel Cord   Marginal Cord Insertion   History of Anemia   Dysmenorrhea   BMI 36.9      Post-operative Diagnosis: same as above and  living infant Female    Procedure: primary low transverse  section    Indications: La Nena Stogsdill 20 y.o. female Kayli Dinero presents for scheduled primary  section 2/2 breech presentation @ 38w0d 2/2 Fetal Growth Restriction (AC<10th%). Risks and benefits of  section were reviewed with the patient and the consent was signed and placed in the chart. Anesthesia and NICU were notified. Surgeon: Dr. Oliver Lav): Mary Beth Khan DO, PGY4; Julieta Segura MD, PGY2; Jhonatan Myles, PGY1    Anesthesia: spinal with duramorph    Procedure Details   The patient was seen pre-operatively. The risks, benefits, complications, treatment options, and expected outcomes were discussed with the patient. The patient concurred with the proposed plan, giving informed consent. The patient was taken to the Operating Room, identified as Shadia Kasper and the procedure verified as  Delivery. A Time Out was held and the above information confirmed. After spinal anesthesia, the patient was draped and prepped in the usual sterile manner. A Pfannenstiel incision was made and carried down through the subcutaneous tissue to the fascia using scalpel. Fascial incision was made and extended transversely using argueta scissors for sharp dissection. The fascia was  from the underlying rectus tissue superiorly and inferiorly using blunt dissection.  The peritoneum was

## 2023-07-03 NOTE — FLOWSHEET NOTE
Pt admitted to room 735 per bed in stable condition from L&D  Oriented to room and surroundings  Bed in lowest position, wheels locked, 2/4 side rails up  Call light in reach, room free of clutter, adequate lighting provided  Denies any further questions at this time  Instructed to call out with any questions/concerns/new onset of pain and/or n/v   White board updated  Continue to monitor with hourly rounding  Non-skid socks on/at bedside

## 2023-07-03 NOTE — CARE COORDINATION
CASE MANAGEMENT POST-PARTUM TRANSITIONAL CARE PLAN    Breech presentation, single or unspecified fetus [O32.1XX0]  45 weeks gestation of pregnancy [Z3A.38]    OB Provider: Dr Scot Gonzalez met w/ Ashly Garduno and Rosario Stinson at her bedside to discuss DCP. She is S/P CS on 7/3/2023    Writer verified address/phone number correct on facesheet. She states she lives with urmila and their dog. Ashly Garduno verbalized no difficulties with transportation to and from doctors appointments or with paying for medications upon discharge home. BCBS insurance correct. Writer notified Le Book she has 30 days from date of birth to add  to insurance policy. She verbalized understanding. Le Book confirmed a safe place for infant to sleep at home. Infant name on BC: [facility] [employer]. Infant PCP [facility].      DME: no  HOME CARE: no    Readmission Risk              Risk of Unplanned Readmission:  6

## 2023-07-03 NOTE — CONSULTS
INPATIENT CONSULT    Maternal /para status: primip      Maternal breastfeeding history:        Current pregnancy:    Gestational age: 37 weeks     C/section or vaginal delivery: c/s for breech, uterus didelphys      Birth weight: 3025  6# 10oz      SGA/LGA/IUGR/diabetes during pregnancy: na      Plan for feeding: breast      Breast pump at home: yes        Assessment of breastfeeding:  Baby having difficulty achieving and maintaining a deep latch, not opening widely and sucking on tongue/lower lip. Moms nipples flat and retract with compression. After 25 minutes for trying, 20mm nipple shield applied. Baby latched readily and nursed with strong suck for 10 minutes.          Reviewed:   - Breastfeeding packet  - Expectations for normal  feeding   - Hand expression  - Deep latch/milk transfer  - Cues for feeding (early/late)     Encouraged:   - Frequent skin to skin with mom or dad  - Frequent attempts to feed  - Calling for assistance as needed

## 2023-07-04 LAB
BASOPHILS # BLD: <0.03 K/UL (ref 0–0.2)
BASOPHILS NFR BLD: 0 % (ref 0–2)
EOSINOPHIL # BLD: <0.03 K/UL (ref 0–0.44)
EOSINOPHILS RELATIVE PERCENT: 0 % (ref 1–4)
ERYTHROCYTE [DISTWIDTH] IN BLOOD BY AUTOMATED COUNT: 13.1 % (ref 11.8–14.4)
HCT VFR BLD AUTO: 26.2 % (ref 36.3–47.1)
HGB BLD-MCNC: 8.3 G/DL (ref 11.9–15.1)
IMM GRANULOCYTES # BLD AUTO: 0.17 K/UL (ref 0–0.3)
IMM GRANULOCYTES NFR BLD: 1 %
LYMPHOCYTES # BLD: 17 % (ref 24–43)
LYMPHOCYTES NFR BLD: 2.66 K/UL (ref 1.1–3.7)
MCH RBC QN AUTO: 26.2 PG (ref 25.2–33.5)
MCHC RBC AUTO-ENTMCNC: 31.7 G/DL (ref 28.4–34.8)
MCV RBC AUTO: 82.6 FL (ref 82.6–102.9)
MONOCYTES NFR BLD: 1.33 K/UL (ref 0.1–1.2)
MONOCYTES NFR BLD: 8 % (ref 3–12)
NEUTROPHILS NFR BLD: 74 % (ref 36–65)
NEUTS SEG NFR BLD: 11.63 K/UL (ref 1.5–8.1)
NRBC BLD-RTO: 0 PER 100 WBC
PLATELET # BLD AUTO: 226 K/UL (ref 138–453)
PMV BLD AUTO: 11.4 FL (ref 8.1–13.5)
RBC # BLD AUTO: 3.17 M/UL (ref 3.95–5.11)
WBC OTHER # BLD: 15.8 K/UL (ref 3.5–11.3)

## 2023-07-04 PROCEDURE — 99024 POSTOP FOLLOW-UP VISIT: CPT | Performed by: OBSTETRICS & GYNECOLOGY

## 2023-07-04 PROCEDURE — 85027 COMPLETE CBC AUTOMATED: CPT

## 2023-07-04 PROCEDURE — 6370000000 HC RX 637 (ALT 250 FOR IP): Performed by: STUDENT IN AN ORGANIZED HEALTH CARE EDUCATION/TRAINING PROGRAM

## 2023-07-04 PROCEDURE — 36415 COLL VENOUS BLD VENIPUNCTURE: CPT

## 2023-07-04 PROCEDURE — 2580000003 HC RX 258

## 2023-07-04 PROCEDURE — 6370000000 HC RX 637 (ALT 250 FOR IP)

## 2023-07-04 PROCEDURE — 1220000000 HC SEMI PRIVATE OB R&B

## 2023-07-04 PROCEDURE — 6360000002 HC RX W HCPCS

## 2023-07-04 RX ORDER — FERROUS SULFATE 325(65) MG
325 TABLET ORAL 2 TIMES DAILY
Qty: 180 TABLET | Refills: 3 | Status: SHIPPED | OUTPATIENT
Start: 2023-07-04

## 2023-07-04 RX ADMIN — ACETAMINOPHEN 1000 MG: 500 TABLET ORAL at 14:43

## 2023-07-04 RX ADMIN — ACETAMINOPHEN 1000 MG: 500 TABLET ORAL at 21:03

## 2023-07-04 RX ADMIN — KETOROLAC TROMETHAMINE 30 MG: 30 INJECTION, SOLUTION INTRAMUSCULAR; INTRAVENOUS at 03:20

## 2023-07-04 RX ADMIN — IBUPROFEN 600 MG: 600 TABLET, FILM COATED ORAL at 21:04

## 2023-07-04 RX ADMIN — ACETAMINOPHEN 1000 MG: 500 TABLET ORAL at 08:25

## 2023-07-04 RX ADMIN — IBUPROFEN 600 MG: 600 TABLET, FILM COATED ORAL at 08:26

## 2023-07-04 RX ADMIN — OXYCODONE HYDROCHLORIDE 10 MG: 5 TABLET ORAL at 22:04

## 2023-07-04 RX ADMIN — OXYCODONE HYDROCHLORIDE 10 MG: 5 TABLET ORAL at 17:40

## 2023-07-04 RX ADMIN — FLUCONAZOLE 150 MG: 50 TABLET ORAL at 08:24

## 2023-07-04 RX ADMIN — IBUPROFEN 600 MG: 600 TABLET, FILM COATED ORAL at 14:43

## 2023-07-04 RX ADMIN — DOCUSATE SODIUM 50 MG AND SENNOSIDES 8.6 MG 2 TABLET: 8.6; 5 TABLET, FILM COATED ORAL at 08:23

## 2023-07-04 RX ADMIN — SODIUM CHLORIDE, PRESERVATIVE FREE 10 ML: 5 INJECTION INTRAVENOUS at 08:24

## 2023-07-04 RX ADMIN — Medication 1 TABLET: at 08:23

## 2023-07-04 RX ADMIN — OXYCODONE HYDROCHLORIDE 5 MG: 5 TABLET ORAL at 13:48

## 2023-07-04 RX ADMIN — DOCUSATE SODIUM 50 MG AND SENNOSIDES 8.6 MG 2 TABLET: 8.6; 5 TABLET, FILM COATED ORAL at 21:03

## 2023-07-04 RX ADMIN — ENOXAPARIN SODIUM 40 MG: 40 INJECTION SUBCUTANEOUS at 21:04

## 2023-07-04 RX ADMIN — SODIUM CHLORIDE, PRESERVATIVE FREE 10 ML: 5 INJECTION INTRAVENOUS at 21:05

## 2023-07-04 RX ADMIN — ACETAMINOPHEN 1000 MG: 500 TABLET ORAL at 01:47

## 2023-07-04 ASSESSMENT — PAIN DESCRIPTION - LOCATION
LOCATION: INCISION

## 2023-07-04 ASSESSMENT — PAIN DESCRIPTION - ORIENTATION: ORIENTATION: LOWER

## 2023-07-04 ASSESSMENT — PAIN DESCRIPTION - PAIN TYPE
TYPE: SURGICAL PAIN

## 2023-07-04 ASSESSMENT — PAIN DESCRIPTION - DESCRIPTORS
DESCRIPTORS: ACHING;BURNING
DESCRIPTORS: DISCOMFORT
DESCRIPTORS: DISCOMFORT
DESCRIPTORS: ACHING;BURNING

## 2023-07-04 ASSESSMENT — PAIN SCALES - GENERAL
PAINLEVEL_OUTOF10: 4
PAINLEVEL_OUTOF10: 7
PAINLEVEL_OUTOF10: 3
PAINLEVEL_OUTOF10: 8
PAINLEVEL_OUTOF10: 2
PAINLEVEL_OUTOF10: 3
PAINLEVEL_OUTOF10: 6
PAINLEVEL_OUTOF10: 6
PAINLEVEL_OUTOF10: 7
PAINLEVEL_OUTOF10: 4
PAINLEVEL_OUTOF10: 5

## 2023-07-04 ASSESSMENT — PAIN - FUNCTIONAL ASSESSMENT
PAIN_FUNCTIONAL_ASSESSMENT: ACTIVITIES ARE NOT PREVENTED

## 2023-07-04 NOTE — LACTATION NOTE
Mom reports baby feeding well with shield, has several questions regarding weaning baby from shield. Discussed follow up with LC.

## 2023-07-05 VITALS
SYSTOLIC BLOOD PRESSURE: 117 MMHG | RESPIRATION RATE: 16 BRPM | DIASTOLIC BLOOD PRESSURE: 86 MMHG | OXYGEN SATURATION: 99 % | HEART RATE: 63 BPM | TEMPERATURE: 97.7 F

## 2023-07-05 PROCEDURE — 2580000003 HC RX 258

## 2023-07-05 PROCEDURE — 6370000000 HC RX 637 (ALT 250 FOR IP)

## 2023-07-05 PROCEDURE — 99024 POSTOP FOLLOW-UP VISIT: CPT | Performed by: OBSTETRICS & GYNECOLOGY

## 2023-07-05 RX ADMIN — DOCUSATE SODIUM 50 MG AND SENNOSIDES 8.6 MG 2 TABLET: 8.6; 5 TABLET, FILM COATED ORAL at 07:50

## 2023-07-05 RX ADMIN — ACETAMINOPHEN 1000 MG: 500 TABLET ORAL at 03:34

## 2023-07-05 RX ADMIN — IBUPROFEN 600 MG: 600 TABLET, FILM COATED ORAL at 09:39

## 2023-07-05 RX ADMIN — Medication 1 TABLET: at 07:50

## 2023-07-05 RX ADMIN — SODIUM CHLORIDE, PRESERVATIVE FREE 10 ML: 5 INJECTION INTRAVENOUS at 07:56

## 2023-07-05 RX ADMIN — IBUPROFEN 600 MG: 600 TABLET, FILM COATED ORAL at 03:34

## 2023-07-05 RX ADMIN — OXYCODONE HYDROCHLORIDE 10 MG: 5 TABLET ORAL at 03:34

## 2023-07-05 RX ADMIN — OXYCODONE HYDROCHLORIDE 10 MG: 5 TABLET ORAL at 07:53

## 2023-07-05 NOTE — PROGRESS NOTES
CLINICAL PHARMACY NOTE: MEDS TO BEDS    Total # of Prescriptions Filled: 4   The following medications were delivered to the patient:  Oxycodone 5mg  Ibuprofen 600mg  Iron 325mg  Acetaminophen 500mg    Additional Documentation: delivered to patient in room 735 7/5 at 10:29am. Co-pay $29.00 clover. Pt declined Senokot 8.6-50mg.

## 2023-07-05 NOTE — LACTATION NOTE
Pt states breastfeeding is going well, tips given to place nipple shield. Encouraged pt to follow up post discharge with lactation to wean from nipple shield. Bucoda hand pump given and instructed on use. Fitted pt for 20-21 mm flanges. Encouraged pt to call out as needed.  Reviewed discharge and normal  feeding patterns, signs of milk transfer and how to know if baby is getting enough at breast.

## 2023-07-06 ENCOUNTER — CARE COORDINATION (OUTPATIENT)
Dept: OTHER | Facility: CLINIC | Age: 23
End: 2023-07-06

## 2023-07-06 NOTE — CARE COORDINATION
ACM is reviewing pt chart for updates on care. Patient had a c/section 7/3/23 and was d/c home yesterday. ACM will make outreach in the next 3 business days to allow for adjustment time. ANNETTE Larkin, RN  Associate Care Manager   Cell: [phone]  [email or web address]

## 2023-07-10 ENCOUNTER — CARE COORDINATION (OUTPATIENT)
Dept: OTHER | Facility: CLINIC | Age: 23
End: 2023-07-10

## 2023-07-10 NOTE — CARE COORDINATION
client to empty bladder every two (2) hours while awake  Review daily fluid need; avoid coffee. Encourage regular rest periods 2-3 times a day in side-lying position. If bedrest is to be continued after discharge, suggest client spend part of day on couch or recliner. Assist patient to identify any risky behaviors and support change;     Red flags maternity  Call 911 anytime you think you may need emergency care. For example, call if:  Call your doctor now or seek immediate medical care if:     You passed out (lost consciousness). You have a seizure. You have severe vaginal bleeding. You have severe pain in your belly or pelvis. You have had fluid gushing or leaking from your vagina and you know or think the  umbilical cord is bulging into your vagina. If this happens, immediately get down on  your knees so your rear end (buttocks) is higher than your head. This will decrease  the pressure on the cord until help arrives. You have signs of preeclampsia, such as:  Sudden swelling of your face, hands, or feet. New vision problems (such as dimness, blurring, or seeing spots). A severe headache. You have any vaginal bleeding. You have belly pain or cramping. You have a fever. You have had regular contractions (with or without pain) for an hour. This means that  you have 8 or more within 1 hour or 4 or more in 20 minutes after you change your  position and drink fluids. You have a sudden release of fluid from your vagina. You have low back pain or pelvic pressure that does not go away. You notice that your baby has stopped moving or is moving much less than normal.            Summary Note: S/w patient today for PP follow-up call. This is patient's first baby  Patient did participate in BWWB and did participate in Maternity Education. Spouse at home helping during PP period. Patient reports no concerns for weight loss for infant. Infant was seen by Peds already and will be seen again in 2 weeks.

## 2023-07-11 ENCOUNTER — NURSE ONLY (OUTPATIENT)
Dept: OBGYN CLINIC | Age: 23
End: 2023-07-11

## 2023-07-11 VITALS — DIASTOLIC BLOOD PRESSURE: 78 MMHG | BODY MASS INDEX: 33.99 KG/M2 | SYSTOLIC BLOOD PRESSURE: 112 MMHG | WEIGHT: 210.6 LBS

## 2023-07-11 NOTE — PROGRESS NOTES
Patient was here for c/s bandage change- there was a small amount of dried blood on the bandage. The incision was clean and dry. Informed patient to look for any drainage, redness, fevers. Notified to follow weight restrictions.  Will be back next week for 2 week PP

## 2023-07-19 ENCOUNTER — POSTPARTUM VISIT (OUTPATIENT)
Dept: OBGYN CLINIC | Age: 23
End: 2023-07-19

## 2023-07-19 VITALS
HEIGHT: 66 IN | DIASTOLIC BLOOD PRESSURE: 60 MMHG | BODY MASS INDEX: 33.27 KG/M2 | WEIGHT: 207 LBS | SYSTOLIC BLOOD PRESSURE: 112 MMHG

## 2023-07-19 DIAGNOSIS — D64.9 ANEMIA, UNSPECIFIED TYPE: ICD-10-CM

## 2023-07-19 DIAGNOSIS — Z98.891 HISTORY OF CESAREAN SECTION: Primary | ICD-10-CM

## 2023-07-19 PROCEDURE — 0503F POSTPARTUM CARE VISIT: CPT | Performed by: NURSE PRACTITIONER

## 2023-07-19 ASSESSMENT — ANXIETY QUESTIONNAIRES
7. FEELING AFRAID AS IF SOMETHING AWFUL MIGHT HAPPEN: 0
4. TROUBLE RELAXING: 0
2. NOT BEING ABLE TO STOP OR CONTROL WORRYING: 0
5. BEING SO RESTLESS THAT IT IS HARD TO SIT STILL: 0
6. BECOMING EASILY ANNOYED OR IRRITABLE: 0
IF YOU CHECKED OFF ANY PROBLEMS ON THIS QUESTIONNAIRE, HOW DIFFICULT HAVE THESE PROBLEMS MADE IT FOR YOU TO DO YOUR WORK, TAKE CARE OF THINGS AT HOME, OR GET ALONG WITH OTHER PEOPLE: NOT DIFFICULT AT ALL
3. WORRYING TOO MUCH ABOUT DIFFERENT THINGS: 0
1. FEELING NERVOUS, ANXIOUS, OR ON EDGE: 0
GAD7 TOTAL SCORE: 0

## 2023-07-19 NOTE — PROGRESS NOTES
333 Replaced by [facility]/ [facility]  [address] [facility] [address] [facility] [address]  [place] [address]  Dept: [phone]  Dept Fax: [phone]    Graeme Mayorga is a 20 y.o. female who presents today for her medical conditions/complaintsas noted below. Graeme Mayorga is c/o of Postpartum Care        HPI:     Danita Ayala presents for 6 week postpartum check up. Delivered  on 7/3/23 No complaints, of chest pain, S.O.B. Headaches, no abdominal pain, GI or  issues. Breastfeeding Yes Signs mastitis No  Bleeding Yes   Birth control options abstinence until 6 weeks    The patient completed :   E.P.D.S. Evaluation form and scored 0. WENDI 7 and scored 0. Denies any  suicidal/homicidal ideations or plans or delusions or hallucinations.     Last PAP:2023 negative    Last HGB:8.3 on 23  GDM:no  gHTN or Preeclampsia: no    OB History    Para Term  AB Living   1 1 1 0 0 1   SAB IAB Ectopic Molar Multiple Live Births   0 0 0 0 0 1      # Outcome Date GA Lbr Colin/2nd Weight Sex Delivery Anes PTL Lv   1 Term 23 38w0d  6 lb 10.7 oz (3.025 kg) F CS-LTranv  N ERNIE       Past Medical History:   Diagnosis Date    Anemia     Pilonidal cyst     Removed 2019    Uterine anomaly       Past Surgical History:   Procedure Laterality Date     SECTION N/A 7/3/2023     SECTION performed by Gal Scruggs DO at [facility] L&D OR    CYST REMOVAL  2019       Family History   Problem Relation Age of Onset    Other Mother         Multiple sclerosis    High Blood Pressure Mother     Stroke Mother     High Cholesterol Father        Social History     Tobacco Use    Smoking status: Never     Passive exposure: Never    Smokeless tobacco: Never   Substance Use Topics    Alcohol use: Not Currently      Current Outpatient Medications   Medication Sig Dispense Refill    ferrous sulfate (IRON 325) 325 (65 Fe) MG tablet Take 1 tablet by mouth 2 times daily 180 tablet 3

## 2023-08-07 ENCOUNTER — HOSPITAL ENCOUNTER (OUTPATIENT)
Age: 23
Setting detail: SPECIMEN
Discharge: HOME | End: 2023-08-07

## 2023-08-07 DIAGNOSIS — Z98.891 HISTORY OF CESAREAN SECTION: ICD-10-CM

## 2023-08-07 DIAGNOSIS — D64.9 ANEMIA, UNSPECIFIED TYPE: ICD-10-CM

## 2023-08-07 LAB
ERYTHROCYTE [DISTWIDTH] IN BLOOD BY AUTOMATED COUNT: 14.2 % (ref 11.8–14.4)
HCT VFR BLD AUTO: 37.6 % (ref 36.3–47.1)
HGB BLD-MCNC: 11.2 G/DL (ref 11.9–15.1)
MCH RBC QN AUTO: 24.5 PG (ref 25.2–33.5)
MCHC RBC AUTO-ENTMCNC: 29.8 G/DL (ref 28.4–34.8)
MCV RBC AUTO: 82.1 FL (ref 82.6–102.9)
NRBC BLD-RTO: 0 PER 100 WBC
PLATELET # BLD AUTO: 305 K/UL (ref 138–453)
PMV BLD AUTO: 10.3 FL (ref 8.1–13.5)
RBC # BLD AUTO: 4.58 M/UL (ref 3.95–5.11)
WBC OTHER # BLD: 5.8 K/UL (ref 3.5–11.3)

## 2023-08-08 ENCOUNTER — POSTPARTUM VISIT (OUTPATIENT)
Dept: OBGYN CLINIC | Age: 23
End: 2023-08-08

## 2023-08-08 VITALS
BODY MASS INDEX: 33.27 KG/M2 | WEIGHT: 207 LBS | HEIGHT: 66 IN | DIASTOLIC BLOOD PRESSURE: 78 MMHG | SYSTOLIC BLOOD PRESSURE: 120 MMHG

## 2023-08-08 DIAGNOSIS — Z98.891 HISTORY OF CESAREAN SECTION: ICD-10-CM

## 2023-08-08 DIAGNOSIS — N94.6 DYSMENORRHEA: ICD-10-CM

## 2023-08-08 PROBLEM — O20.9 VAGINAL BLEEDING IN PREGNANCY, FIRST TRIMESTER: Status: RESOLVED | Noted: 2022-12-08 | Resolved: 2023-08-08

## 2023-08-08 PROBLEM — Q51.28 PREGNANCY WITH CONGENITAL DUPLICATION OF UTERUS IN FIRST TRIMESTER: Status: RESOLVED | Noted: 2022-12-08 | Resolved: 2023-08-08

## 2023-08-08 PROBLEM — O34.591 PREGNANCY WITH CONGENITAL DUPLICATION OF UTERUS IN FIRST TRIMESTER: Status: RESOLVED | Noted: 2022-12-08 | Resolved: 2023-08-08

## 2023-08-08 PROBLEM — O09.899 SINGLE UMBILICAL ARTERY, MATERNAL, ANTEPARTUM: Status: RESOLVED | Noted: 2023-03-07 | Resolved: 2023-08-08

## 2023-08-08 PROBLEM — D50.9 IRON DEFICIENCY ANEMIA: Status: RESOLVED | Noted: 2019-01-25 | Resolved: 2023-08-08

## 2023-08-08 PROBLEM — O44.20 PLACENTA MARGINALIS, ANTEPARTUM: Status: RESOLVED | Noted: 2023-03-07 | Resolved: 2023-08-08

## 2023-08-08 PROCEDURE — 0503F POSTPARTUM CARE VISIT: CPT | Performed by: ADVANCED PRACTICE MIDWIFE

## 2023-08-08 RX ORDER — NORETHINDRONE ACETATE AND ETHINYL ESTRADIOL 1MG-20(24)
1 KIT ORAL DAILY
Qty: 3 PACKET | Refills: 3 | Status: SHIPPED | OUTPATIENT
Start: 2023-08-08

## 2023-08-08 ASSESSMENT — ANXIETY QUESTIONNAIRES
3. WORRYING TOO MUCH ABOUT DIFFERENT THINGS: 0
IF YOU CHECKED OFF ANY PROBLEMS ON THIS QUESTIONNAIRE, HOW DIFFICULT HAVE THESE PROBLEMS MADE IT FOR YOU TO DO YOUR WORK, TAKE CARE OF THINGS AT HOME, OR GET ALONG WITH OTHER PEOPLE: NOT DIFFICULT AT ALL
5. BEING SO RESTLESS THAT IT IS HARD TO SIT STILL: 0
1. FEELING NERVOUS, ANXIOUS, OR ON EDGE: 0
6. BECOMING EASILY ANNOYED OR IRRITABLE: 0
GAD7 TOTAL SCORE: 0
7. FEELING AFRAID AS IF SOMETHING AWFUL MIGHT HAPPEN: 0
2. NOT BEING ABLE TO STOP OR CONTROL WORRYING: 0
4. TROUBLE RELAXING: 0

## 2023-08-08 NOTE — PROGRESS NOTES
cessation counseling:  n/a  Family planning needs  Family planning counseling was completed. Patient was advices to avoid interpregnancy intervals shorter than 6 months. Reviewed recommendations for repeat pregnancy after 18 months. Contraception plans: oral contraceptives (estrogen/progesterone)    1. 6 weeks postpartum follow-up    2. PLTCS 7/3/23 F Apg 8/9, Wt 6#10    3. Dysmenorrhea  - Patient was educated to notify office and seek medical care if abdominal pain, chest pain, severe headaches, eye problems/loss of vision, or severe leg pain or swelling in the calf occurs (ACHES). - Norethin Ace-Eth Estrad-FE (BLISOVI 24 FE) 1-20 MG-MCG(24) TABS; Take 1 tablet by mouth daily  Dispense: 3 packet; Refill: 3          Patient was seen with total face to face time of 25 minutes.   More than 50% of this visit was counseling and education regarding her Post Partum visit Please make an appointment to see your primary care provider within a week. To see a provider at [facility], contact the clinic at [phone].

## 2023-08-13 DIAGNOSIS — D64.9 ANEMIA, UNSPECIFIED TYPE: ICD-10-CM

## 2023-08-13 DIAGNOSIS — Z98.891 HISTORY OF CESAREAN SECTION: Primary | ICD-10-CM

## 2023-08-17 ENCOUNTER — PATIENT MESSAGE (OUTPATIENT)
Dept: OBGYN CLINIC | Age: 23
End: 2023-08-17

## 2023-08-17 RX ORDER — DROSPIRENONE 4 MG/1
1 TABLET, FILM COATED ORAL DAILY
Qty: 28 TABLET | Refills: 3 | Status: SHIPPED | OUTPATIENT
Start: 2023-08-17

## 2023-08-18 NOTE — TELEPHONE ENCOUNTER
From: Tram Bell  To: Saw Lara  Sent: 8/17/2023 5:43 AM EDT  Subject: Birth control and milk supply    Hello. I started taking the pill for birth control a week ago and I can start to see a decrease in my milk supply. Is there anyway to help increase it or a different pill possibly to take? Thank you.

## 2023-08-31 ENCOUNTER — CARE COORDINATION (OUTPATIENT)
Dept: OTHER | Facility: CLINIC | Age: 23
End: 2023-08-31

## 2023-09-14 ENCOUNTER — CARE COORDINATION (OUTPATIENT)
Dept: OTHER | Facility: CLINIC | Age: 23
End: 2023-09-14

## 2024-01-26 ENCOUNTER — CARE COORDINATION (OUTPATIENT)
Dept: OTHER | Facility: CLINIC | Age: 24
End: 2024-01-26

## 2024-03-14 ENCOUNTER — OFFICE VISIT (OUTPATIENT)
Dept: OBGYN CLINIC | Age: 24
End: 2024-03-14

## 2024-03-14 VITALS
SYSTOLIC BLOOD PRESSURE: 118 MMHG | BODY MASS INDEX: 33.43 KG/M2 | DIASTOLIC BLOOD PRESSURE: 78 MMHG | HEIGHT: 66 IN | WEIGHT: 208 LBS

## 2024-03-14 DIAGNOSIS — N94.6 DYSMENORRHEA: Primary | ICD-10-CM

## 2024-03-14 DIAGNOSIS — Z79.899 MEDICATION MANAGEMENT: ICD-10-CM

## 2024-03-14 DIAGNOSIS — N92.1 MENORRHAGIA WITH IRREGULAR CYCLE: ICD-10-CM

## 2024-03-14 NOTE — PROGRESS NOTES
OB/GYN Follow up Visit    La Nena Barcenas  3/14/2024                       Primary Care Physician: Clotilde Brown, KAR - CNP    CC:   Chief Complaint   Patient presents with    Medication Check         HPI: La Nena Barcenas is a 23 y.o. female   No LMP recorded.    The patient was seen and examined. She is here for follow up from initiating slynd. Patient is very happy with the medication and wants to continue it's use.       REVIEW OF SYSTEMS:   Constitutional: negative fever, negative chills  HEENT: negative visual disturbances, negative headaches  Respiratory: negative dyspnea, negative cough  Cardiovascular: negative chest pain,  negative palpitations  Gastrointestinal: negative abdominal pain, negative RUQ pain, negative N/V, negative diarrhea, negative constipation  Genitourinary: negative dysuria, negative vaginal discharge  Dermatological: negative rash  Hematologic: negative bruising  Immunologic/Lymphatic: negative recent illness, negative recent sick contact  Musculoskeletal: negative back pain, negative myalgias, negative arthralgias  Neurological:  negative dizziness, negative weakness  Behavior/Psych: negative depression, negative anxiety    ________________________________________________________________________      OBSTETRICAL HISTORY:  OB History    Para Term  AB Living   1 1 1 0 0 1   SAB IAB Ectopic Molar Multiple Live Births   0 0 0 0 0 1      # Outcome Date GA Lbr Colin/2nd Weight Sex Delivery Anes PTL Lv   1 Term 23 38w0d  3.025 kg (6 lb 10.7 oz) F CS-LTranv  N ERNIE       PAST MEDICAL HISTORY:      Diagnosis Date    Anemia     Pilonidal cyst     Removed 2019    Uterine anomaly        PAST SURGICAL HISTORY:                                                                    Procedure Laterality Date     SECTION N/A 7/3/2023     SECTION performed by Gray Wells DO at [facility] L&D OR    CYST REMOVAL  2019       MEDICATIONS:  Current

## 2024-03-15 PROBLEM — R79.89 LOW VITAMIN B12 LEVEL: Status: RESOLVED | Noted: 2019-01-26 | Resolved: 2024-03-15

## 2024-07-26 ENCOUNTER — PATIENT MESSAGE (OUTPATIENT)
Dept: PRIMARY CARE CLINIC | Age: 24
End: 2024-07-26

## 2024-07-26 DIAGNOSIS — N76.0 ACUTE VAGINITIS: Primary | ICD-10-CM

## 2024-07-26 RX ORDER — FLUCONAZOLE 150 MG/1
150 TABLET ORAL ONCE
Qty: 1 TABLET | Refills: 0 | OUTPATIENT
Start: 2024-07-26 | End: 2024-07-26

## 2024-07-26 NOTE — TELEPHONE ENCOUNTER
From: La Nena Barcenas  To: Clotilde Brown  Sent: 7/26/2024 8:44 AM EDT  Subject: Yeast infection     Hello,    I have a yeast infection and was wondering if I could get a prescription of the medication to help alleviate it.     Thank you,  La Nena Barcenas

## 2024-08-01 ENCOUNTER — E-VISIT (OUTPATIENT)
Dept: PRIMARY CARE CLINIC | Age: 24
End: 2024-08-01
Payer: COMMERCIAL

## 2024-08-01 DIAGNOSIS — N76.0 ACUTE VAGINITIS: Primary | ICD-10-CM

## 2024-08-01 PROCEDURE — 99422 OL DIG E/M SVC 11-20 MIN: CPT | Performed by: NURSE PRACTITIONER

## 2024-08-01 RX ORDER — FLUCONAZOLE 150 MG/1
150 TABLET ORAL ONCE
Qty: 1 TABLET | Refills: 1 | Status: SHIPPED | OUTPATIENT
Start: 2024-08-01 | End: 2024-08-01

## 2024-08-01 NOTE — PROGRESS NOTES
La Nena Barcenas (2000) initiated an asynchronous digital communication through travayl.    HPI: per patient questionnaire     Exam: not applicable    Diagnoses and all orders for this visit:  Diagnoses and all orders for this visit:    Acute vaginitis    Other orders  -     fluconazole (DIFLUCAN) 150 MG tablet; Take 1 tablet by mouth once for 1 dose    Medication sent. Supportive care. F/u with me or gyn as needed    Time: EV2 - 11-20 minutes were spent on the digital evaluation and management of this patient. 15 min     KAR Wheeler - CNP

## 2024-08-05 ENCOUNTER — TELEPHONE (OUTPATIENT)
Dept: OBGYN CLINIC | Age: 24
End: 2024-08-05

## 2024-08-05 ENCOUNTER — OFFICE VISIT (OUTPATIENT)
Dept: FAMILY MEDICINE CLINIC | Age: 24
End: 2024-08-05
Payer: COMMERCIAL

## 2024-08-05 ENCOUNTER — TELEPHONE (OUTPATIENT)
Dept: PRIMARY CARE CLINIC | Age: 24
End: 2024-08-05

## 2024-08-05 ENCOUNTER — HOSPITAL ENCOUNTER (OUTPATIENT)
Age: 24
Setting detail: SPECIMEN
Discharge: HOME | End: 2024-08-05

## 2024-08-05 VITALS
SYSTOLIC BLOOD PRESSURE: 116 MMHG | HEART RATE: 106 BPM | DIASTOLIC BLOOD PRESSURE: 74 MMHG | OXYGEN SATURATION: 97 % | RESPIRATION RATE: 14 BRPM

## 2024-08-05 DIAGNOSIS — N89.8 VAGINAL ITCHING: ICD-10-CM

## 2024-08-05 DIAGNOSIS — N76.0 ACUTE VAGINITIS: Primary | ICD-10-CM

## 2024-08-05 DIAGNOSIS — R82.998 LEUKOCYTES IN URINE: ICD-10-CM

## 2024-08-05 LAB
BILIRUBIN, POC: NORMAL
BLOOD URINE, POC: NORMAL
CLARITY, POC: NORMAL
COLOR, POC: NORMAL
CONTROL: NORMAL
GLUCOSE URINE, POC: NORMAL
KETONES, POC: NORMAL
LEUKOCYTE EST, POC: NORMAL
NITRITE, POC: NORMAL
PH, POC: 5.5
PREGNANCY TEST URINE, POC: NORMAL
PROTEIN, POC: NORMAL
SPECIFIC GRAVITY, POC: >=1.03
UROBILINOGEN, POC: 0.2

## 2024-08-05 PROCEDURE — 81002 URINALYSIS NONAUTO W/O SCOPE: CPT | Performed by: NURSE PRACTITIONER

## 2024-08-05 PROCEDURE — 81025 URINE PREGNANCY TEST: CPT | Performed by: NURSE PRACTITIONER

## 2024-08-05 PROCEDURE — 99214 OFFICE O/P EST MOD 30 MIN: CPT | Performed by: NURSE PRACTITIONER

## 2024-08-05 RX ORDER — NYSTATIN 100000 U/G
CREAM TOPICAL
Qty: 30 G | Refills: 0 | Status: SHIPPED | OUTPATIENT
Start: 2024-08-05

## 2024-08-05 RX ORDER — FLUCONAZOLE 150 MG/1
150 TABLET ORAL ONCE
Qty: 1 TABLET | Refills: 0 | Status: SHIPPED | OUTPATIENT
Start: 2024-08-05 | End: 2024-08-05

## 2024-08-05 ASSESSMENT — ENCOUNTER SYMPTOMS
VOMITING: 0
ABDOMINAL PAIN: 0
NAUSEA: 0

## 2024-08-05 ASSESSMENT — PATIENT HEALTH QUESTIONNAIRE - PHQ9
SUM OF ALL RESPONSES TO PHQ QUESTIONS 1-9: 0
SUM OF ALL RESPONSES TO PHQ QUESTIONS 1-9: 0
2. FEELING DOWN, DEPRESSED OR HOPELESS: NOT AT ALL
SUM OF ALL RESPONSES TO PHQ QUESTIONS 1-9: 0
SUM OF ALL RESPONSES TO PHQ QUESTIONS 1-9: 0
SUM OF ALL RESPONSES TO PHQ9 QUESTIONS 1 & 2: 0
1. LITTLE INTEREST OR PLEASURE IN DOING THINGS: NOT AT ALL

## 2024-08-05 NOTE — TELEPHONE ENCOUNTER
Pt called into office, states she had a evist last week with PCP for a yeast infection. Pt states she took both doses of diflucan, one last Thursday and one Saturday. Pt still c/o of vaginal itching, burning & discharge. Pt asking if PCP can order cultures for her. Please advise.

## 2024-08-05 NOTE — TELEPHONE ENCOUNTER
Pt called stating has itching and burning, pt did state did take duflucen and does not believe it is a yest infection. Advised pt to go to urgent care

## 2024-08-05 NOTE — TELEPHONE ENCOUNTER
LOV 3/3/23  NOV 9/15/23  Lipids done 5/1/23   Patient notified and verbalized understanding. Patient agreeable to walk-in clinic.

## 2024-08-05 NOTE — PROGRESS NOTES
[facility] PHYSICIANS [facility], [facility] WALK-IN  [address]  [address]  [facility] [address]  Dept: [phone]  Dept Fax: [phone]    La Nena Barcenas is a 24 y.o. female who presents today for her medical conditions/complaints of   Chief Complaint   Patient presents with    Vaginal Itching     X 2 weeks, burning, change in discharge, took a round of diflucan but not helping           HPI:     /74   Pulse (!) 106   Resp 14   LMP 2024 (Approximate)   SpO2 97%       HPI  Pt presented to the walk in today with c/o vaginal itching and burning for 2 weeks.  Had E-visit with PCP and was treated with Diflucan x 2 with no improvement.  2 days ago she noticed a brownish vaginal discharge. No odor.  She has been using OTC Vagasil cream with little relief.   No concern for STD.      Past Medical History:   Diagnosis Date    Anemia     Pilonidal cyst     Removed 2019    Uterine anomaly         Past Surgical History:   Procedure Laterality Date     SECTION N/A 7/3/2023     SECTION performed by Gray Wells DO at [facility] L[facility] OR    CYST REMOVAL  2019       Family History   Problem Relation Age of Onset    Other Mother         Multiple sclerosis    High Blood Pressure Mother     Stroke Mother     High Cholesterol Father        Social History     Tobacco Use    Smoking status: Never     Passive exposure: Never    Smokeless tobacco: Never   Substance Use Topics    Alcohol use: Not Currently        Prior to Visit Medications    Medication Sig Taking? Authorizing Provider   nystatin (MYCOSTATIN) 829418 UNIT/GM cream Apply topically 2 times daily. Yes Massiel Figueroa APRN - CNP   fluconazole (DIFLUCAN) 150 MG tablet Take 1 tablet by mouth once for 1 dose Yes Massiel Figueroa APRN - CNP   Drospirenone 4 MG TABS Take 1 tablet by mouth at bedtime Yes Vibha Spain DO   Drospirenone (SLYND) 4 MG TABS Take 1 tablet by mouth daily Yes

## 2024-08-06 DIAGNOSIS — N76.0 ACUTE VAGINITIS: ICD-10-CM

## 2024-08-06 DIAGNOSIS — R82.998 LEUKOCYTES IN URINE: ICD-10-CM

## 2024-08-06 LAB
CANDIDA SPECIES: NEGATIVE
GARDNERELLA VAGINALIS: POSITIVE
SOURCE: ABNORMAL
TRICHOMONAS: NEGATIVE

## 2024-08-06 RX ORDER — FLUCONAZOLE 150 MG/1
150 TABLET ORAL ONCE
Qty: 1 TABLET | Refills: 0 | Status: SHIPPED | OUTPATIENT
Start: 2024-08-06 | End: 2024-08-06

## 2024-08-06 RX ORDER — METRONIDAZOLE 500 MG/1
500 TABLET ORAL 2 TIMES DAILY
Qty: 14 TABLET | Refills: 0 | Status: SHIPPED | OUTPATIENT
Start: 2024-08-06 | End: 2024-08-13

## 2024-08-07 LAB
MICROORGANISM SPEC CULT: NORMAL
SERVICE CMNT-IMP: NORMAL
SPECIMEN DESCRIPTION: NORMAL

## 2024-08-12 ENCOUNTER — TELEPHONE (OUTPATIENT)
Dept: PRIMARY CARE CLINIC | Age: 24
End: 2024-08-12

## 2024-08-12 ENCOUNTER — HOSPITAL ENCOUNTER (EMERGENCY)
Age: 24
Discharge: HOME | End: 2024-08-12
Attending: EMERGENCY MEDICINE
Payer: COMMERCIAL

## 2024-08-12 VITALS
HEART RATE: 103 BPM | DIASTOLIC BLOOD PRESSURE: 85 MMHG | TEMPERATURE: 98.4 F | HEIGHT: 66 IN | BODY MASS INDEX: 33.66 KG/M2 | SYSTOLIC BLOOD PRESSURE: 131 MMHG | WEIGHT: 209.44 LBS | OXYGEN SATURATION: 98 % | RESPIRATION RATE: 18 BRPM

## 2024-08-12 DIAGNOSIS — L03.818 CELLULITIS OF OTHER SPECIFIED SITE: Primary | ICD-10-CM

## 2024-08-12 DIAGNOSIS — N76.0 BV (BACTERIAL VAGINOSIS): Primary | ICD-10-CM

## 2024-08-12 DIAGNOSIS — N30.01 ACUTE CYSTITIS WITH HEMATURIA: ICD-10-CM

## 2024-08-12 DIAGNOSIS — B96.89 BV (BACTERIAL VAGINOSIS): Primary | ICD-10-CM

## 2024-08-12 LAB
BILIRUB UR QL STRIP: NEGATIVE
CANDIDA SPECIES: NEGATIVE
CLARITY UR: CLEAR
COLOR UR: YELLOW
EPI CELLS #/AREA URNS HPF: NORMAL /HPF (ref 0–5)
GARDNERELLA VAGINALIS: NEGATIVE
GLUCOSE BLD-MCNC: 104 MG/DL (ref 65–105)
GLUCOSE UR STRIP-MCNC: NEGATIVE MG/DL
HCG UR QL: NEGATIVE
HGB UR QL STRIP.AUTO: NEGATIVE
KETONES UR STRIP-MCNC: ABNORMAL MG/DL
LEUKOCYTE ESTERASE UR QL STRIP: ABNORMAL
NITRITE UR QL STRIP: NEGATIVE
PH UR STRIP: 6 [PH] (ref 5–8)
PROT UR STRIP-MCNC: NEGATIVE MG/DL
RBC #/AREA URNS HPF: NORMAL /HPF (ref 0–2)
SOURCE: NORMAL
SP GR UR STRIP: 1.03 (ref 1–1.03)
TRICHOMONAS: NEGATIVE
UROBILINOGEN UR STRIP-ACNC: NORMAL EU/DL (ref 0–1)
WBC #/AREA URNS HPF: NORMAL /HPF (ref 0–5)

## 2024-08-12 PROCEDURE — 99283 EMERGENCY DEPT VISIT LOW MDM: CPT

## 2024-08-12 PROCEDURE — 81025 URINE PREGNANCY TEST: CPT

## 2024-08-12 PROCEDURE — 6370000000 HC RX 637 (ALT 250 FOR IP): Performed by: NURSE PRACTITIONER

## 2024-08-12 PROCEDURE — 87510 GARDNER VAG DNA DIR PROBE: CPT

## 2024-08-12 PROCEDURE — 87086 URINE CULTURE/COLONY COUNT: CPT

## 2024-08-12 PROCEDURE — 81001 URINALYSIS AUTO W/SCOPE: CPT

## 2024-08-12 PROCEDURE — 82947 ASSAY GLUCOSE BLOOD QUANT: CPT

## 2024-08-12 PROCEDURE — 87491 CHLMYD TRACH DNA AMP PROBE: CPT

## 2024-08-12 PROCEDURE — 87591 N.GONORRHOEAE DNA AMP PROB: CPT

## 2024-08-12 PROCEDURE — 87660 TRICHOMONAS VAGIN DIR PROBE: CPT

## 2024-08-12 PROCEDURE — 87480 CANDIDA DNA DIR PROBE: CPT

## 2024-08-12 RX ORDER — CEPHALEXIN 500 MG/1
500 CAPSULE ORAL 3 TIMES DAILY
Qty: 30 CAPSULE | Refills: 0 | Status: SHIPPED | OUTPATIENT
Start: 2024-08-12 | End: 2024-08-22

## 2024-08-12 RX ORDER — CLINDAMYCIN HYDROCHLORIDE 300 MG/1
300 CAPSULE ORAL 2 TIMES DAILY
Qty: 14 CAPSULE | Refills: 0 | Status: SHIPPED | OUTPATIENT
Start: 2024-08-12 | End: 2024-08-19

## 2024-08-12 RX ORDER — CEPHALEXIN 250 MG/1
500 CAPSULE ORAL ONCE
Status: COMPLETED | OUTPATIENT
Start: 2024-08-12 | End: 2024-08-12

## 2024-08-12 RX ADMIN — CEPHALEXIN 500 MG: 250 CAPSULE ORAL at 20:42

## 2024-08-12 ASSESSMENT — PAIN DESCRIPTION - PAIN TYPE: TYPE: ACUTE PAIN

## 2024-08-12 ASSESSMENT — PAIN DESCRIPTION - DESCRIPTORS: DESCRIPTORS: BURNING;ITCHING

## 2024-08-12 ASSESSMENT — PAIN - FUNCTIONAL ASSESSMENT: PAIN_FUNCTIONAL_ASSESSMENT: 0-10

## 2024-08-12 ASSESSMENT — PAIN DESCRIPTION - LOCATION: LOCATION: VAGINA

## 2024-08-12 ASSESSMENT — PAIN SCALES - GENERAL: PAINLEVEL_OUTOF10: 8

## 2024-08-12 NOTE — ED PROVIDER NOTES
[facility] EMERGENCY DEPARTMENT  EMERGENCY DEPARTMENT ENCOUNTER      Pt Name: La Nena Barcenas  MRN: [id number]  Birthdate 2000  Date of evaluation: 8/12/2024  Provider: KAR Rust CNP  3:17 PM    CHIEF COMPLAINT       Chief Complaint   Patient presents with    Vaginal Itching     Onset about 3 weeks ago.  Persistent vaginal itch and burning.  Recently completed abx for bacterial vaginosis.  Still having symptoms.          HISTORY OF PRESENT ILLNESS    La Nena Barcenas is a 24 y.o. female who presents to the emergency department      This is a nontoxic-appearing 24-year-old female presenting to the emergency department with her  the patient reports for the last 3 weeks she has been dealing with vaginal itching, burning sensation-patient states that she feels like she may be excoriated from scratching, she attempted to get in with her OB/GYN who recommended she goes to the urgent care, 1 week ago she did go to the walk-in [facility] clinic, states that she did self swabs, was diagnosed with bacterial vaginosis, completed Flagyl twice a day for 7 days, she has had no fevers with this no abdominal pain, she states some burning with urination; denies concerns for STDs STIs; has tried using Monistat, was prescribed p.o. clindamycin today took 1 dose, was concerned because she has not had pelvic exam; morning worsening symptoms prompting her to come to the emergency department.  She is not short of breath [facility] 7/24/2024    The history is provided by the patient and medical records.       Nursing Notes were reviewed.    REVIEW OF SYSTEMS       Review of Systems   Constitutional:  Negative for chills, fatigue and fever.   HENT:  Negative for congestion and sore throat.    Respiratory:  Negative for cough and shortness of breath.    Cardiovascular:  Negative for chest pain and leg swelling.   Gastrointestinal:  Negative for abdominal pain, diarrhea, nausea and vomiting.   Genitourinary:  Positive

## 2024-08-13 ASSESSMENT — ENCOUNTER SYMPTOMS
SHORTNESS OF BREATH: 0
ABDOMINAL PAIN: 0
NAUSEA: 0
DIARRHEA: 0
VOMITING: 0
BACK PAIN: 0
SORE THROAT: 0
COUGH: 0

## 2024-08-13 NOTE — DISCHARGE INSTRUCTIONS
Complete antibiotic as prescribed.    Use topical Dermoplast as prescribed.    Return to ER: Fevers, continued or worsening symptoms, abdominal pain, vomiting, weakness, development of rashes; or any other concerning symptoms.    Any additional pelvic cultures that returned positive will be notified to you by telephone.

## 2024-08-13 NOTE — ED PROVIDER NOTES
[facility] Emergency Department  [address]  [facility] [place] [facility] [place] [address]  Phone: [phone]  Fax: [phone]      Pt Name: La Nena Barcenas  MRN: [id number]  Birthdate 2000  Date of evaluation: 24    CHIEF COMPLAINT       Chief Complaint   Patient presents with    Vaginal Itching     Onset about 3 weeks ago.  Persistent vaginal itch and burning.  Recently completed abx for bacterial vaginosis.  Still having symptoms.        PAST MEDICAL HISTORY    has a past medical history of Anemia, Pilonidal cyst, and Uterine anomaly.    SURGICAL HISTORY      has a past surgical history that includes cyst removal (2019) and  section (N/A, 7/3/2023).    CURRENT MEDICATIONS       Previous Medications    CLINDAMYCIN (CLEOCIN) 300 MG CAPSULE    Take 1 capsule by mouth 2 times daily for 7 days    DROSPIRENONE (SLYND) 4 MG TABS    Take 1 tablet by mouth daily    DROSPIRENONE 4 MG TABS    Take 1 tablet by mouth at bedtime    NYSTATIN (MYCOSTATIN) 196791 UNIT/GM CREAM    Apply topically 2 times daily.       ALLERGIES     is allergic to no known allergies.    Vitals:    24 1810   BP: 131/85   Pulse: (!) 103   Resp: 18   Temp: 98.4 °F (36.9 °C)   TempSrc: Oral   SpO2: 98%   Weight: 95 kg (209 lb 7 oz)   Height: 1.68 m (5' 6.14\")            FINAL IMPRESSION      1. Cellulitis of other specified site    2. Acute cystitis with hematuria          DISPOSITION/PLAN   DISPOSITION Decision To Discharge 2024 08:26:57 PM        PATIENT REFERRED TO:  Clotilde Brown, APRN - CNP  [address] [place]  [place] [address]  [phone]            Schedule appointment with OBGYN for re-evaluation.          DISCHARGE MEDICATIONS:  New Prescriptions    BENZOCAINE-MENTHOL (DERMOPLAST) 20-0.5 % AERO SPRAY    Apply 1 spray topically as needed for Pain or Irritation    CEPHALEXIN (KEFLEX) 500 MG CAPSULE    Take 1 capsule by mouth 3 times daily for 10 days       Based on the medical record,

## 2024-08-15 ENCOUNTER — TELEPHONE (OUTPATIENT)
Dept: OBGYN CLINIC | Age: 24
End: 2024-08-15

## 2024-08-15 ENCOUNTER — OFFICE VISIT (OUTPATIENT)
Dept: PRIMARY CARE CLINIC | Age: 24
End: 2024-08-15
Payer: COMMERCIAL

## 2024-08-15 VITALS
HEIGHT: 66 IN | DIASTOLIC BLOOD PRESSURE: 64 MMHG | HEART RATE: 112 BPM | BODY MASS INDEX: 33.94 KG/M2 | SYSTOLIC BLOOD PRESSURE: 120 MMHG | OXYGEN SATURATION: 99 % | WEIGHT: 211.2 LBS | RESPIRATION RATE: 16 BRPM

## 2024-08-15 DIAGNOSIS — N76.0 ACUTE VAGINITIS: Primary | ICD-10-CM

## 2024-08-15 PROCEDURE — 99214 OFFICE O/P EST MOD 30 MIN: CPT | Performed by: NURSE PRACTITIONER

## 2024-08-15 RX ORDER — HYDROXYZINE HYDROCHLORIDE 25 MG/1
25 TABLET, FILM COATED ORAL EVERY 8 HOURS PRN
Qty: 30 TABLET | Refills: 0 | Status: SHIPPED | OUTPATIENT
Start: 2024-08-15 | End: 2024-08-25

## 2024-08-15 RX ORDER — FLUCONAZOLE 150 MG/1
150 TABLET ORAL WEEKLY
Qty: 5 TABLET | Refills: 0 | Status: SHIPPED | OUTPATIENT
Start: 2024-08-15

## 2024-08-15 RX ORDER — PREDNISONE 20 MG/1
20 TABLET ORAL 2 TIMES DAILY
Qty: 10 TABLET | Refills: 0 | Status: SHIPPED | OUTPATIENT
Start: 2024-08-15 | End: 2024-08-20

## 2024-08-15 RX ORDER — CLOBETASOL PROPIONATE 0.5 MG/G
CREAM TOPICAL
Qty: 45 G | Refills: 0 | Status: SHIPPED | OUTPATIENT
Start: 2024-08-15

## 2024-08-15 SDOH — ECONOMIC STABILITY: FOOD INSECURITY: WITHIN THE PAST 12 MONTHS, YOU WORRIED THAT YOUR FOOD WOULD RUN OUT BEFORE YOU GOT MONEY TO BUY MORE.: NEVER TRUE

## 2024-08-15 SDOH — ECONOMIC STABILITY: FOOD INSECURITY: WITHIN THE PAST 12 MONTHS, THE FOOD YOU BOUGHT JUST DIDN'T LAST AND YOU DIDN'T HAVE MONEY TO GET MORE.: NEVER TRUE

## 2024-08-15 SDOH — ECONOMIC STABILITY: INCOME INSECURITY: HOW HARD IS IT FOR YOU TO PAY FOR THE VERY BASICS LIKE FOOD, HOUSING, MEDICAL CARE, AND HEATING?: NOT HARD AT ALL

## 2024-08-15 ASSESSMENT — PATIENT HEALTH QUESTIONNAIRE - PHQ9
SUM OF ALL RESPONSES TO PHQ QUESTIONS 1-9: 0
SUM OF ALL RESPONSES TO PHQ QUESTIONS 1-9: 0
2. FEELING DOWN, DEPRESSED OR HOPELESS: NOT AT ALL
SUM OF ALL RESPONSES TO PHQ QUESTIONS 1-9: 0
1. LITTLE INTEREST OR PLEASURE IN DOING THINGS: NOT AT ALL
SUM OF ALL RESPONSES TO PHQ9 QUESTIONS 1 & 2: 0
SUM OF ALL RESPONSES TO PHQ QUESTIONS 1-9: 0

## 2024-08-15 ASSESSMENT — ENCOUNTER SYMPTOMS
ABDOMINAL PAIN: 0
COUGH: 0
BACK PAIN: 0
SHORTNESS OF BREATH: 0

## 2024-08-15 NOTE — TELEPHONE ENCOUNTER
Pt has been seen by walk-in clinic, ER and PCP for vaginitis type symptoms did speak with PCP today and is going to start her on Diflucan again and clobetasol and oral steroid.      Can try to get patient in next week with 1 of us for a follow-up please

## 2024-08-15 NOTE — PROGRESS NOTES
and fever.   HENT:  Negative for congestion.    Eyes:  Negative for visual disturbance.   Respiratory:  Negative for cough and shortness of breath.    Cardiovascular:  Negative for chest pain and palpitations.   Gastrointestinal:  Negative for abdominal pain.   Genitourinary:  Positive for vaginal bleeding (started period) and vaginal pain. Negative for dysuria and vaginal discharge.   Musculoskeletal:  Negative for back pain.   Neurological:  Negative for dizziness, numbness and headaches.   Psychiatric/Behavioral:  Negative for self-injury, sleep disturbance and suicidal ideas. The patient is not nervous/anxious.        Objective:     Physical Exam  Vitals and nursing note reviewed.   Constitutional:       Appearance: She is well-developed.   HENT:      Head: Normocephalic and atraumatic.   Eyes:      Pupils: Pupils are equal, round, and reactive to light.   Cardiovascular:      Rate and Rhythm: Normal rate and regular rhythm.      Heart sounds: Normal heart sounds.   Pulmonary:      Effort: Pulmonary effort is normal.      Breath sounds: Normal breath sounds.   Abdominal:      General: Bowel sounds are normal.      Palpations: Abdomen is soft.      Tenderness: There is no abdominal tenderness.   Genitourinary:     Labia:         Right: Tenderness present.         Left: Tenderness present.           Comments: Erythema    Musculoskeletal:         General: Normal range of motion.      Cervical back: Normal range of motion and neck supple.   Skin:     General: Skin is warm and dry.   Neurological:      Mental Status: She is alert and oriented to person, place, and time.   Psychiatric:         Behavior: Behavior normal.         Thought Content: Thought content normal.         Judgment: Judgment normal.     /64 (Site: Left Upper Arm, Position: Sitting, Cuff Size: Medium Adult)   Pulse (!) 112   Resp 16   Ht 1.676 m (5' 6\")   Wt 95.8 kg (211 lb 3.2 oz)   LMP  (LMP Unknown)   SpO2 99%   Breastfeeding Yes

## 2024-08-28 ENCOUNTER — HOSPITAL ENCOUNTER (OUTPATIENT)
Age: 24
Setting detail: SPECIMEN
Discharge: HOME | End: 2024-08-28

## 2024-08-28 ENCOUNTER — OFFICE VISIT (OUTPATIENT)
Dept: OBGYN CLINIC | Age: 24
End: 2024-08-28
Payer: COMMERCIAL

## 2024-08-28 VITALS
SYSTOLIC BLOOD PRESSURE: 118 MMHG | HEIGHT: 66 IN | WEIGHT: 211 LBS | BODY MASS INDEX: 33.91 KG/M2 | DIASTOLIC BLOOD PRESSURE: 62 MMHG

## 2024-08-28 DIAGNOSIS — N89.8 VAGINAL DISCHARGE: ICD-10-CM

## 2024-08-28 DIAGNOSIS — N89.8 VAGINAL DISCHARGE: Primary | ICD-10-CM

## 2024-08-28 DIAGNOSIS — N89.8 VAGINAL IRRITATION: ICD-10-CM

## 2024-08-28 DIAGNOSIS — L30.9 DERMATITIS: ICD-10-CM

## 2024-08-28 DIAGNOSIS — N76.0 ACUTE VAGINITIS: ICD-10-CM

## 2024-08-28 PROCEDURE — 99214 OFFICE O/P EST MOD 30 MIN: CPT | Performed by: NURSE PRACTITIONER

## 2024-08-28 ASSESSMENT — ENCOUNTER SYMPTOMS
COLOR CHANGE: 0
COUGH: 0
NAUSEA: 0
SHORTNESS OF BREATH: 0
VOMITING: 0

## 2024-08-28 NOTE — PROGRESS NOTES
[facility] OB/GYN [address]  [address]  [facility] [address]  Dept: [phone]  Dept Fax: [phone]    La Nena Barcenas is a 24 y.o. female who presents today for her medical conditions/complaintsas noted below.  La Nena Barcenas is c/o of Follow-up (Vaginitis issues)        HPI:     HPI  Pt is here with complaints of a vaginitis symptoms.     La Nena states symptoms originally started in mid to end of July after she was on a vacation.  She states on vacation she was swimming in an indoor pool.  She states once he came back from vacation she started having vaginal itching and irritation she did an e-visit on 7/26/24 and she was prescribed Diflucan X2 doses.  She was still having vaginal itching and discharge and she then went to walk-in clinic on 8/5/2024 and had urine culture that was negative and vaginal DNA culture that was positive for bacterial vaginosis she was treated with oral Flagyl and given a Diflucan to take after given she was concerned about yeast infection after this.  She states a week later had severe burning and irritation to her vaginal area she went to the emergency department on 8/12/2024 pelvic exam with DNA and GC culture that was negative.  She was diagnosed with cellulitis in the ER and a possible UTI and was started on Keflex.  Her urine culture ended up coming back negative.  She states that the Keflex but did not help much and she then went to her PCP office on 8/15/2024 and was given oral steroids for 5 days, hydroxyzine as needed for itching fluconazole 150 mg weekly for 4 weeks and clobetasol topical.  She states she finally started to notice improvement in her symptoms last week.  She states she still has some mild itching and irritation but has not improved significantly.  Denies any vaginal discharge, odor, itching.  Denies history of HSV.  Denies dyspareunia, PCB.   No UTI sx, no pelvic pain, nausea/vomiting.  fullness.          Left: No mass, tenderness or fullness.     Musculoskeletal:         General: Normal range of motion.      Cervical back: Normal range of motion and neck supple.   Skin:     General: Skin is warm and dry.      Findings: No rash.   Neurological:      Mental Status: She is alert and oriented to person, place, and time.      Cranial Nerves: No cranial nerve deficit.   Psychiatric:         Behavior: Behavior normal.         Thought Content: Thought content normal.         Judgment: Judgment normal.       /62 (Site: Left Upper Arm, Position: Sitting, Cuff Size: Large Adult)   Ht 1.676 m (5' 6\")   Wt 95.7 kg (211 lb)   LMP 08/14/2024 (Approximate)   Breastfeeding Yes   BMI 34.06 kg/m²     Assessment:   Assessment & Plan    Diagnosis Orders   1. Vaginal discharge  Vaginitis DNA Probe      2. Vaginal irritation        3. Acute vaginitis        4. Dermatitis            Pelvic exam completed cultures obtained and sent    Plan:      Vaginal irritation/ pain, discharge- check cultures ( vag dna) tx if positive.  Due to pregnant picture of when she had severe irritation diagnosis cellulitis in the emergency department.  She had numerous plaque excoriated areas.  On exam no genital lesions noted, denies hx of HSV,   Discussed if return of lesion/lesions could do HSV culture and or consider HSV labs VDRL labs but declined.   Her symptoms improved after oral steroids seems like may have been some type of dermatitis possibility.  Recommend she use hygiene measures that avoid any tight fitting clothing, wipes or soaps with fragrance.    Avoid douching  Use a fragrance free pH neutral soap.  Recommend Probiotic daily    Can consider metronidazole gel 0.75 percent for 7 to 10 days followed by twice weekly dosing of gel for four to six months or boric acid suppositories for 15-30 days.       No CMT tenderness or pelvic tenderness with exam, denies consitutional symptoms.  Monitor for fevers, chills, n/v,

## 2024-08-29 LAB
CANDIDA SPECIES: NEGATIVE
GARDNERELLA VAGINALIS: POSITIVE
SOURCE: ABNORMAL
TRICHOMONAS: NEGATIVE

## 2024-08-30 RX ORDER — METRONIDAZOLE 500 MG/1
500 TABLET ORAL 2 TIMES DAILY
Qty: 14 TABLET | Refills: 0 | Status: SHIPPED | OUTPATIENT
Start: 2024-08-30 | End: 2024-09-06

## 2024-09-24 LAB
CHOLEST SERPL-MCNC: 138 MG/DL
CHOLESTEROL/HDL RATIO: 3.2
GLUCOSE SERPL-MCNC: 88 MG/DL (ref 70–99)
HDLC SERPL-MCNC: 43 MG/DL
LDLC SERPL CALC-MCNC: 79 MG/DL (ref 0–130)
PATIENT FASTING?: YES
TRIGL SERPL-MCNC: 81 MG/DL

## 2024-09-26 NOTE — TELEPHONE ENCOUNTER
I will send in Clindamycin for her to take.  Please let her know that Clindamycin can pass into the breast milk in small amounts and may cause GI upset in baby.    
Pt informed and verbalized understanding   
The patient called stating that the Metronidazole that was given for her vaginitis is not helping and that her symptoms has worsened.    Patient asking for an alternative medication to the sent to the pharmacy, to the [facility].    Please advise.  
none

## 2024-12-05 ENCOUNTER — OFFICE VISIT (OUTPATIENT)
Dept: PRIMARY CARE CLINIC | Age: 24
End: 2024-12-05
Payer: COMMERCIAL

## 2024-12-05 VITALS
RESPIRATION RATE: 14 BRPM | BODY MASS INDEX: 34.61 KG/M2 | HEART RATE: 80 BPM | WEIGHT: 214.4 LBS | OXYGEN SATURATION: 99 % | SYSTOLIC BLOOD PRESSURE: 116 MMHG | DIASTOLIC BLOOD PRESSURE: 78 MMHG

## 2024-12-05 DIAGNOSIS — Z00.00 ENCOUNTER FOR WELL ADULT EXAM WITHOUT ABNORMAL FINDINGS: Primary | ICD-10-CM

## 2024-12-05 PROCEDURE — 99395 PREV VISIT EST AGE 18-39: CPT | Performed by: NURSE PRACTITIONER

## 2024-12-05 SDOH — ECONOMIC STABILITY: FOOD INSECURITY: WITHIN THE PAST 12 MONTHS, THE FOOD YOU BOUGHT JUST DIDN'T LAST AND YOU DIDN'T HAVE MONEY TO GET MORE.: NEVER TRUE

## 2024-12-05 SDOH — ECONOMIC STABILITY: FOOD INSECURITY: WITHIN THE PAST 12 MONTHS, YOU WORRIED THAT YOUR FOOD WOULD RUN OUT BEFORE YOU GOT MONEY TO BUY MORE.: NEVER TRUE

## 2024-12-05 SDOH — ECONOMIC STABILITY: INCOME INSECURITY: HOW HARD IS IT FOR YOU TO PAY FOR THE VERY BASICS LIKE FOOD, HOUSING, MEDICAL CARE, AND HEATING?: NOT HARD AT ALL

## 2024-12-05 ASSESSMENT — ENCOUNTER SYMPTOMS
SINUS PAIN: 0
SHORTNESS OF BREATH: 0
TROUBLE SWALLOWING: 0
DIARRHEA: 0
EYE DISCHARGE: 0
CHEST TIGHTNESS: 0
WHEEZING: 0
VOMITING: 0
SINUS PRESSURE: 0
EYE REDNESS: 0
SORE THROAT: 0
NAUSEA: 0
ABDOMINAL PAIN: 0
EYE ITCHING: 0
COUGH: 0

## 2024-12-05 ASSESSMENT — PATIENT HEALTH QUESTIONNAIRE - PHQ9
SUM OF ALL RESPONSES TO PHQ QUESTIONS 1-9: 0
2. FEELING DOWN, DEPRESSED OR HOPELESS: NOT AT ALL
1. LITTLE INTEREST OR PLEASURE IN DOING THINGS: NOT AT ALL
SUM OF ALL RESPONSES TO PHQ QUESTIONS 1-9: 0
SUM OF ALL RESPONSES TO PHQ QUESTIONS 1-9: 0
SUM OF ALL RESPONSES TO PHQ9 QUESTIONS 1 & 2: 0
SUM OF ALL RESPONSES TO PHQ QUESTIONS 1-9: 0

## 2024-12-05 NOTE — PROGRESS NOTES
Well Adult Note  Name: La Nena Barcenas Today’s Date: 2024   MRN: [id number] Sex: Female   Age: 24 y.o. Ethnicity: Non- / Non    : 2000 Race: White (non-)      La Nena Barcenas is here for a well adult exam.       Assessment & Plan   Encounter for well adult exam without abnormal findings    -bp stable today. No need for medication. No labs needed. Reviewed be well labs     Ok to fu in 1 year   Return in about 1 year (around 2025) for physical .       Subjective   History:    Patient presents for her annual physical  Blood pressure stable  Weight is stable    Patient presents for her annual physical  Bp elevated at her be well. She did just walk into the building from the parking lot at the time. She has no hx of htn. Told to have a follow up.     She had a baby girl. She is 17 months old. She had a good pregnancy. She had a c section.   She did have vaginal issues for a month. Finally resolved. She did end up seeing walk in, er and then gyn. She does have a history of BV.   Review of Systems   Constitutional:  Negative for chills, fatigue and fever.   HENT:  Negative for ear discharge, ear pain, sinus pressure, sinus pain, sore throat and trouble swallowing.    Eyes:  Negative for discharge, redness and itching.   Respiratory:  Negative for cough, chest tightness, shortness of breath and wheezing.    Cardiovascular:  Negative for chest pain.   Gastrointestinal:  Negative for abdominal pain, diarrhea, nausea and vomiting.   Genitourinary:  Negative for difficulty urinating.   Musculoskeletal:  Negative for arthralgias and neck pain.   Skin:  Negative for rash.   Neurological:  Negative for dizziness, weakness, light-headedness and headaches.   All other systems reviewed and are negative.      Allergies   Allergen Reactions    No Known Allergies      Prior to Visit Medications    Medication Sig Taking? Authorizing Provider   Drospirenone (SLYND) 4 MG TABS Take 1 tablet by mouth

## 2024-12-27 ENCOUNTER — HOSPITAL ENCOUNTER (OUTPATIENT)
Age: 24
Discharge: HOME | End: 2024-12-27
Payer: COMMERCIAL

## 2024-12-27 ENCOUNTER — TELEPHONE (OUTPATIENT)
Dept: OBGYN CLINIC | Age: 24
End: 2024-12-27

## 2024-12-27 DIAGNOSIS — N91.2 AMENORRHEA: Primary | ICD-10-CM

## 2024-12-27 DIAGNOSIS — N91.2 AMENORRHEA: ICD-10-CM

## 2024-12-27 LAB — B-HCG SERPL EIA 3RD IS-ACNC: <0.2 MIU/ML (ref 0–7)

## 2024-12-27 PROCEDURE — 84702 CHORIONIC GONADOTROPIN TEST: CPT

## 2024-12-27 PROCEDURE — 36415 COLL VENOUS BLD VENIPUNCTURE: CPT

## 2024-12-27 NOTE — TELEPHONE ENCOUNTER
Patient sent in message yesterday and called today concerning amenorrhea, nipple discharge and low back pain. Hcg sent and once have result will call pt to schedule appts accordingly.

## 2025-01-06 ENCOUNTER — NURSE TRIAGE (OUTPATIENT)
Dept: OTHER | Facility: CLINIC | Age: 25
End: 2025-01-06

## 2025-01-06 NOTE — TELEPHONE ENCOUNTER
Location of patient: [place]    Location of employment: [employer] where injury occurred: [employer] Care Unit    Location of injury (body part involved): right wrist    Time of injury: 1145    Last 4 of SSN: [id number]    Location associate referred to for care: home care    Subjective: Caller states \"we were transferring a pt from the bed to the wheelchair, he started to slip and I grabbed the gait belt and my hand got stuck between his arm and his torso\"     Current Symptoms:     Pain Severity: 1/10; sore;     Temperature:      What has been tried: nothing    LMP: NA Pregnant: NA    Recommended disposition: Home Care    Care advice provided, caller verbalizes understanding; denies any other questions or concerns.    Caller agrees with home care disposition    Reason for Disposition   Minor injury or pain from twisting or over-stretching    Protocols used: Wrist Injury-ADULT-[place]

## 2025-02-13 ENCOUNTER — OFFICE VISIT (OUTPATIENT)
Dept: OBGYN CLINIC | Age: 25
End: 2025-02-13
Payer: COMMERCIAL

## 2025-02-13 VITALS
HEIGHT: 66 IN | RESPIRATION RATE: 14 BRPM | WEIGHT: 220.8 LBS | BODY MASS INDEX: 35.48 KG/M2 | DIASTOLIC BLOOD PRESSURE: 80 MMHG | SYSTOLIC BLOOD PRESSURE: 116 MMHG

## 2025-02-13 DIAGNOSIS — N92.1 MENORRHAGIA WITH IRREGULAR CYCLE: Primary | ICD-10-CM

## 2025-02-13 DIAGNOSIS — N94.6 DYSMENORRHEA: ICD-10-CM

## 2025-02-13 DIAGNOSIS — N91.2 AMENORRHEA: ICD-10-CM

## 2025-02-13 PROCEDURE — 99214 OFFICE O/P EST MOD 30 MIN: CPT | Performed by: NURSE PRACTITIONER

## 2025-02-13 RX ORDER — NORETHINDRONE ACETATE AND ETHINYL ESTRADIOL 1MG-20(21)
KIT ORAL
Qty: 1 PACKET | Refills: 3 | Status: SHIPPED | OUTPATIENT
Start: 2025-02-13

## 2025-02-13 SDOH — ECONOMIC STABILITY: FOOD INSECURITY: WITHIN THE PAST 12 MONTHS, YOU WORRIED THAT YOUR FOOD WOULD RUN OUT BEFORE YOU GOT MONEY TO BUY MORE.: PATIENT DECLINED

## 2025-02-13 SDOH — ECONOMIC STABILITY: FOOD INSECURITY: WITHIN THE PAST 12 MONTHS, THE FOOD YOU BOUGHT JUST DIDN'T LAST AND YOU DIDN'T HAVE MONEY TO GET MORE.: PATIENT DECLINED

## 2025-02-13 ASSESSMENT — ENCOUNTER SYMPTOMS
NAUSEA: 0
COUGH: 0
SHORTNESS OF BREATH: 0
VOMITING: 0
COLOR CHANGE: 0

## 2025-02-13 ASSESSMENT — PATIENT HEALTH QUESTIONNAIRE - PHQ9: DEPRESSION UNABLE TO ASSESS: PT REFUSES

## 2025-02-13 NOTE — PROGRESS NOTES
[facility] OB/GYN [place]  [address]  [address]  [place] [address]  Dept: [phone]  Dept Fax: [phone]    La Nena Barcenas is a 24 y.o. female who presents today for her medical conditions/complaintsas noted below.  La Nena Barcenas is c/o of Other (Wants to discuss a different BC)        HPI:     HPI    La Nena Barcenas is here to discuss history heavy irregula painful periods.  She states prior to being on hormonal contraceptives in 2019 her periods would be monthly but they would last sometimes up to 2 weeks and be heavy and she did have significant cramping with them.  She has now been on Slynd p.o. P for over a year.  She states she has stopped breast-feeding in July 2024.  She is her last menstrual period has now been in August 2024.  She did have negative hCG in December 2024.  She was notified that about 40% of people on Slynd did not have a menses.  She states she ran out of it and stopped it in middle of January and she wanted to discuss going back on her prior combined hormonal contraceptive as she is no longer breast-feeding.    She has any current pelvic pain, vaginal discharge, odor or itching.  Denies dyspareunia.      She states they do plan to try for pregnancy again in December 2025.    HX previous contraception usage Sh ehad been on [facility] loestrin originally after 6 week postpartum appt in August 2023- she had then called in noting a decrease in her milk supply so she was switched to Slynd progesterone only pill.  She has been on this since.  On Sandra in 2019   On loestrin from 7746-9350 (until she got pregnant in 2022- wa snot on OCP at time of + pregnancy)  Menses age 9  Sexually active:   Last PAP: June 2023  Hx of STI Denies      She reports there is a personal history or family history of:    Smoking (> 15 cigs/day): no    Migraine with Aura: no    HTN (> 160/100):  no    DVT:  no    Thrombophilias:  no    Stroke

## 2025-05-08 ENCOUNTER — OFFICE VISIT (OUTPATIENT)
Dept: OBGYN CLINIC | Age: 25
End: 2025-05-08
Payer: COMMERCIAL

## 2025-05-08 ENCOUNTER — HOSPITAL ENCOUNTER (OUTPATIENT)
Age: 25
Setting detail: SPECIMEN
Discharge: HOME | End: 2025-05-08

## 2025-05-08 VITALS
HEIGHT: 66 IN | BODY MASS INDEX: 35.36 KG/M2 | DIASTOLIC BLOOD PRESSURE: 86 MMHG | WEIGHT: 220 LBS | SYSTOLIC BLOOD PRESSURE: 118 MMHG

## 2025-05-08 DIAGNOSIS — N94.6 DYSMENORRHEA: ICD-10-CM

## 2025-05-08 DIAGNOSIS — N92.1 MENORRHAGIA WITH IRREGULAR CYCLE: ICD-10-CM

## 2025-05-08 DIAGNOSIS — Z79.899 ENCOUNTER FOR MEDICATION MANAGEMENT: ICD-10-CM

## 2025-05-08 DIAGNOSIS — Z01.419 WELL WOMAN EXAM WITH ROUTINE GYNECOLOGICAL EXAM: Primary | ICD-10-CM

## 2025-05-08 PROCEDURE — 99395 PREV VISIT EST AGE 18-39: CPT | Performed by: NURSE PRACTITIONER

## 2025-05-08 RX ORDER — NORETHINDRONE ACETATE AND ETHINYL ESTRADIOL 1MG-20(21)
KIT ORAL
Qty: 3 PACKET | Refills: 2 | Status: SHIPPED | OUTPATIENT
Start: 2025-05-08

## 2025-05-08 ASSESSMENT — ENCOUNTER SYMPTOMS
COLOR CHANGE: 0
NAUSEA: 0
COUGH: 0
SHORTNESS OF BREATH: 0
VOMITING: 0

## 2025-05-08 ASSESSMENT — PATIENT HEALTH QUESTIONNAIRE - PHQ9: DEPRESSION UNABLE TO ASSESS: PT REFUSES

## 2025-05-08 NOTE — PROGRESS NOTES
La Nena Barcenas is a 24 y.o.  here for her annual exam.  The patient was seen and examined.The patients past medical, surgical, social and family history were reviewed.  Current medications and allergies were reviewed, and documented in the chart.    She is  and they have a daughter who will turn to the summer.    Last PAP: NILM  hx of abnormal PAP no   Denies family hx uterine, ovarian, breast, or colon cancer   HPV vaccine: has completed.         Sexually active: yes Dyspareunia: No, Vaginal discharge: no,  UTI symptoms: no, voiding difficulties: no, bowels regular:Yes bloating:no        Menstrual history: Menarche age 9. cycle every  28 days,   Birth control: just switched bach to combined oral contraceptive, denies hx of blood clot or clotting disorder.  Denies chest pain or pressure, SOB, calf pain or swelling, headaches, or vision changes.   Periods have been monthly regular lasting between 4 to 7 days denies heavy bleeding or significant pelvic pain or cramping.  LMP: 25    OB History    Para Term  AB Living   1 1 1 0 0 1   SAB IAB Ectopic Molar Multiple Live Births   0 0 0 0 0 1      # Outcome Date GA Lbr Colin/2nd Weight Sex Type Anes PTL Lv   1 Term 23 38w0d  3.025 kg (6 lb 10.7 oz) F CS-LTranv  N ERNIE       Vitals:    25 0905   BP: 118/86   BP Site: Left Upper Arm   Patient Position: Sitting   BP Cuff Size: Medium Adult   Weight: 99.8 kg (220 lb)   Height: 1.676 m (5' 6\")       Wt Readings from Last 3 Encounters:   25 99.8 kg (220 lb)   25 100.2 kg (220 lb 12.8 oz)   24 97.3 kg (214 lb 6.4 oz)     Past Medical History:   Diagnosis Date    Anemia     Pilonidal cyst     Removed 2019    Uterine anomaly                                                                    Past Surgical History:   Procedure Laterality Date     SECTION N/A 7/3/2023     SECTION performed by Gray Wells DO at [facility] L[facility]    CYST REMOVAL  2019

## 2025-05-19 LAB — CYTOLOGY REPORT: NORMAL

## 2025-05-20 ENCOUNTER — RESULTS FOLLOW-UP (OUTPATIENT)
Dept: OBGYN CLINIC | Age: 25
End: 2025-05-20

## 2025-06-11 LAB
CHOLEST SERPL-MCNC: 131 MG/DL (ref 0–199)
CHOLESTEROL/HDL RATIO: 2.5
GLUCOSE SERPL-MCNC: 96 MG/DL (ref 74–99)
HDLC SERPL-MCNC: 52 MG/DL
LDLC SERPL CALC-MCNC: 65 MG/DL (ref 0–100)
PATIENT FASTING?: YES
TRIGL SERPL-MCNC: 68 MG/DL (ref 0–149)

## (undated) DEVICE — SUTURE VCRL SZ 2-0 L36IN ABSRB VLT L36MM CT-1 1/2 CIR J345H

## (undated) DEVICE — KENDALL SCD EXPRESS SLEEVES, KNEE LENGTH, MEDIUM: Brand: KENDALL SCD

## (undated) DEVICE — SOLUTION SOD CHL 0.9% 1000ML

## (undated) DEVICE — Z DUP USE 2522782 SOLUTION IRRIG 1000ML STRL H2O PLAS CONTAINER UROMATIC

## (undated) DEVICE — SUTURE MCRYL SZ 4-0 L18IN ABSRB UD L19MM PS-2 3/8 CIR PRIM Y496G

## (undated) DEVICE — TRAY SPNL 24GA L4IN PENCAN PNCL PNT NDL 0.75% BIPIVCAIN W/

## (undated) DEVICE — STERILE POLYISOPRENE POWDER-FREE SURGICAL GLOVES WITH EMOLLIENT COATING: Brand: PROTEXIS

## (undated) DEVICE — TOWEL SURG W16XL26IN WHT NONFENESTRATED ST 2 PER PK